# Patient Record
Sex: FEMALE | Race: BLACK OR AFRICAN AMERICAN | Employment: UNEMPLOYED | URBAN - METROPOLITAN AREA
[De-identification: names, ages, dates, MRNs, and addresses within clinical notes are randomized per-mention and may not be internally consistent; named-entity substitution may affect disease eponyms.]

---

## 2018-10-23 ENCOUNTER — HOSPITAL ENCOUNTER (EMERGENCY)
Age: 23
Discharge: LEFT BEFORE DISCHARGE | End: 2018-10-23

## 2018-10-23 ENCOUNTER — HOSPITAL ENCOUNTER (EMERGENCY)
Age: 23
Discharge: HOME | End: 2018-10-23
Attending: EMERGENCY MEDICINE
Payer: MEDICAID

## 2018-10-23 VITALS
RESPIRATION RATE: 16 BRPM | WEIGHT: 250 LBS | BODY MASS INDEX: 42.68 KG/M2 | HEIGHT: 64 IN | TEMPERATURE: 98.5 F | OXYGEN SATURATION: 100 % | HEART RATE: 88 BPM | DIASTOLIC BLOOD PRESSURE: 68 MMHG | SYSTOLIC BLOOD PRESSURE: 145 MMHG

## 2018-10-23 DIAGNOSIS — R10.9 ABDOMINAL PAIN DURING PREGNANCY IN FIRST TRIMESTER: Primary | ICD-10-CM

## 2018-10-23 DIAGNOSIS — O26.891 ABDOMINAL PAIN DURING PREGNANCY IN FIRST TRIMESTER: Primary | ICD-10-CM

## 2018-10-23 LAB
BACTERIA: ABNORMAL /HPF
BILIRUBIN URINE: ABNORMAL
BLOOD, URINE: NEGATIVE
CASTS: ABNORMAL /LPF
CLARITY: CLEAR
COLOR: ABNORMAL
EPITHELIAL CELLS, UA: ABNORMAL /HPF
GLUCOSE URINE: NEGATIVE MG/DL
HCG(URINE) PREGNANCY TEST: POSITIVE
KETONES, URINE: NEGATIVE MG/DL
LEUKOCYTE ESTERASE, URINE: NEGATIVE
NITRITE, URINE: NEGATIVE
PH UA: 5 (ref 5–9)
PROTEIN UA: NEGATIVE MG/DL
RBC UA: ABNORMAL /HPF (ref 0–2)
SPECIFIC GRAVITY UA: >=1.03 (ref 1–1.03)
UROBILINOGEN, URINE: 0.2 E.U./DL
WBC UA: ABNORMAL /HPF (ref 0–5)

## 2018-10-23 PROCEDURE — 99284 EMERGENCY DEPT VISIT MOD MDM: CPT

## 2018-10-23 PROCEDURE — 81025 URINE PREGNANCY TEST: CPT

## 2018-10-23 PROCEDURE — 81001 URINALYSIS AUTO W/SCOPE: CPT

## 2018-10-24 ENCOUNTER — APPOINTMENT (OUTPATIENT)
Dept: ULTRASOUND IMAGING | Age: 23
End: 2018-10-24
Payer: MEDICAID

## 2018-10-24 ENCOUNTER — HOSPITAL ENCOUNTER (EMERGENCY)
Age: 23
Discharge: HOME | End: 2018-10-24
Attending: EMERGENCY MEDICINE
Payer: MEDICAID

## 2018-10-24 VITALS
HEART RATE: 79 BPM | DIASTOLIC BLOOD PRESSURE: 66 MMHG | WEIGHT: 264.13 LBS | BODY MASS INDEX: 46.8 KG/M2 | TEMPERATURE: 98.5 F | RESPIRATION RATE: 16 BRPM | SYSTOLIC BLOOD PRESSURE: 111 MMHG | HEIGHT: 63 IN | OXYGEN SATURATION: 99 %

## 2018-10-24 DIAGNOSIS — Z3A.01 LESS THAN 8 WEEKS GESTATION OF PREGNANCY: Primary | ICD-10-CM

## 2018-10-24 DIAGNOSIS — N89.8 VAGINAL DISCHARGE: ICD-10-CM

## 2018-10-24 DIAGNOSIS — N76.0 BACTERIAL VAGINOSIS: ICD-10-CM

## 2018-10-24 DIAGNOSIS — B96.89 BACTERIAL VAGINOSIS: ICD-10-CM

## 2018-10-24 LAB
ANION GAP SERPL CALCULATED.3IONS-SCNC: 8 MMOL/L (ref 7–16)
BACTERIA: ABNORMAL /HPF
BASOPHILS ABSOLUTE: 0.08 E9/L (ref 0–0.2)
BASOPHILS RELATIVE PERCENT: 1.2 % (ref 0–2)
BILIRUBIN URINE: NEGATIVE
BLOOD, URINE: NEGATIVE
BUN BLDV-MCNC: 7 MG/DL (ref 6–20)
CALCIUM SERPL-MCNC: 9.2 MG/DL (ref 8.6–10.2)
CHLORIDE BLD-SCNC: 100 MMOL/L (ref 98–107)
CLARITY: ABNORMAL
CLUE CELLS: ABNORMAL
CO2: 27 MMOL/L (ref 22–29)
COLOR: YELLOW
CREAT SERPL-MCNC: 0.7 MG/DL (ref 0.5–1)
EOSINOPHILS ABSOLUTE: 0.13 E9/L (ref 0.05–0.5)
EOSINOPHILS RELATIVE PERCENT: 2 % (ref 0–6)
EPITHELIAL CELLS, UA: ABNORMAL /HPF
GFR AFRICAN AMERICAN: >60
GFR NON-AFRICAN AMERICAN: >60 ML/MIN/1.73
GLUCOSE BLD-MCNC: 88 MG/DL (ref 74–109)
GLUCOSE URINE: NEGATIVE MG/DL
GONADOTROPIN, CHORIONIC (HCG) QUANT: 321.2 MIU/ML
HCT VFR BLD CALC: 40.8 % (ref 34–48)
HEMOGLOBIN: 13.5 G/DL (ref 11.5–15.5)
IMMATURE GRANULOCYTES #: 0.01 E9/L
IMMATURE GRANULOCYTES %: 0.2 % (ref 0–5)
KETONES, URINE: NEGATIVE MG/DL
LEUKOCYTE ESTERASE, URINE: NEGATIVE
LYMPHOCYTES ABSOLUTE: 2.18 E9/L (ref 1.5–4)
LYMPHOCYTES RELATIVE PERCENT: 33.9 % (ref 20–42)
MCH RBC QN AUTO: 30 PG (ref 26–35)
MCHC RBC AUTO-ENTMCNC: 33.1 % (ref 32–34.5)
MCV RBC AUTO: 90.7 FL (ref 80–99.9)
MONOCYTES ABSOLUTE: 0.45 E9/L (ref 0.1–0.95)
MONOCYTES RELATIVE PERCENT: 7 % (ref 2–12)
NEUTROPHILS ABSOLUTE: 3.58 E9/L (ref 1.8–7.3)
NEUTROPHILS RELATIVE PERCENT: 55.7 % (ref 43–80)
NITRITE, URINE: NEGATIVE
PDW BLD-RTO: 12.7 FL (ref 11.5–15)
PH UA: 8.5 (ref 5–9)
PLATELET # BLD: 333 E9/L (ref 130–450)
PMV BLD AUTO: 9.1 FL (ref 7–12)
POTASSIUM SERPL-SCNC: 3.9 MMOL/L (ref 3.5–5)
PROTEIN UA: NEGATIVE MG/DL
RBC # BLD: 4.5 E12/L (ref 3.5–5.5)
RBC UA: ABNORMAL /HPF (ref 0–2)
SODIUM BLD-SCNC: 135 MMOL/L (ref 132–146)
SOURCE WET PREP: ABNORMAL
SPECIFIC GRAVITY UA: 1.01 (ref 1–1.03)
TRICHOMONAS PREP: ABNORMAL
UROBILINOGEN, URINE: 0.2 E.U./DL
WBC # BLD: 6.4 E9/L (ref 4.5–11.5)
WBC UA: ABNORMAL /HPF (ref 0–5)
YEAST WET PREP: ABNORMAL

## 2018-10-24 PROCEDURE — 87491 CHLMYD TRACH DNA AMP PROBE: CPT

## 2018-10-24 PROCEDURE — 80048 BASIC METABOLIC PNL TOTAL CA: CPT

## 2018-10-24 PROCEDURE — 81001 URINALYSIS AUTO W/SCOPE: CPT

## 2018-10-24 PROCEDURE — 76817 TRANSVAGINAL US OBSTETRIC: CPT

## 2018-10-24 PROCEDURE — 87210 SMEAR WET MOUNT SALINE/INK: CPT

## 2018-10-24 PROCEDURE — 84702 CHORIONIC GONADOTROPIN TEST: CPT

## 2018-10-24 PROCEDURE — 85025 COMPLETE CBC W/AUTO DIFF WBC: CPT

## 2018-10-24 PROCEDURE — 87591 N.GONORRHOEAE DNA AMP PROB: CPT

## 2018-10-24 PROCEDURE — 6370000000 HC RX 637 (ALT 250 FOR IP): Performed by: EMERGENCY MEDICINE

## 2018-10-24 PROCEDURE — 99284 EMERGENCY DEPT VISIT MOD MDM: CPT

## 2018-10-24 PROCEDURE — 76801 OB US < 14 WKS SINGLE FETUS: CPT

## 2018-10-24 PROCEDURE — 36415 COLL VENOUS BLD VENIPUNCTURE: CPT

## 2018-10-24 RX ORDER — ACETAMINOPHEN 325 MG/1
650 TABLET ORAL ONCE
Status: COMPLETED | OUTPATIENT
Start: 2018-10-24 | End: 2018-10-24

## 2018-10-24 RX ADMIN — ACETAMINOPHEN 650 MG: 325 TABLET, FILM COATED ORAL at 12:39

## 2018-10-24 ASSESSMENT — PAIN SCALES - GENERAL
PAINLEVEL_OUTOF10: 6
PAINLEVEL_OUTOF10: 5

## 2018-10-24 ASSESSMENT — PAIN DESCRIPTION - LOCATION: LOCATION: ABDOMEN;BACK

## 2018-10-24 ASSESSMENT — ENCOUNTER SYMPTOMS
BLOOD IN STOOL: 0
ABDOMINAL PAIN: 1
SHORTNESS OF BREATH: 0
ABDOMINAL DISTENTION: 0
COUGH: 0
EYE REDNESS: 0
NAUSEA: 0
BACK PAIN: 1
WHEEZING: 0
DIARRHEA: 0
EYE DISCHARGE: 0
SORE THROAT: 0
EYE PAIN: 0
VOMITING: 0
CONSTIPATION: 0

## 2018-10-24 ASSESSMENT — PAIN DESCRIPTION - PAIN TYPE: TYPE: ACUTE PAIN

## 2018-10-24 NOTE — ED PROVIDER NOTES
The patient is a G3P[de-identified]73-year-old female presents to the ED for the chief complaint of abdominal pain and back pain. Onset started a couple days ago. Her abdominal pain is located in the middle of her lower abdomen. She describes it as a constant, aching, cramping pain. She reports it feels like a menstrual period but is later and hurts less than a menstrual period. She also complains of low back pain. She has tried Tylenol with some relief. The patient reports she was seen at the urgent care yesterday for her symptoms and a urine pregnancy test was positive. She was instructed to come to the emergency department for evaluation for possible ectopic pregnancy. She denies any fever, cough, chest pain, shortness of breath, nausea, vomiting or diarrhea. She denies any dysuria or hematuria. She does complain of white, vaginal discharge. She denies any vaginal bleeding or abdominal trauma. Patient reports she has a history of gonorrhea, chlamydia and syphilis in the past where she was treated. She does not have an obstetrician gynecologist. She is not currently on prenatal vitamins. She denies taking any medications except for occasional Tylenol and took half dose of a Flexeril pill last week before she found out she was pregnant. She denies any smoking, alcohol or illicit drug use. The history is provided by the patient. Review of Systems   Constitutional: Negative for chills and fever. HENT: Negative for ear pain and sore throat. Eyes: Negative for pain, discharge and redness. Respiratory: Negative for cough, shortness of breath and wheezing. Cardiovascular: Negative for chest pain. Gastrointestinal: Positive for abdominal pain. Negative for abdominal distention, blood in stool, constipation, diarrhea, nausea and vomiting. Genitourinary: Positive for vaginal discharge. Negative for difficulty urinating, dysuria, frequency, hematuria and vaginal bleeding.    Musculoskeletal: Positive for back discharge in the vaginal vault. Cervix is closed. No vaginal bleeding. No foreign body visualized. 3:05 PM  Discussed results and plan thus far with patient. Discussed results of the blood work as well as the serum hCG the ultrasound. Discussed at that the serum hCG level is low which may indicate an early pregnancy, but we cannot rule out an ectopic pregnancy at this time because no gestation is identified at this time. 4:06 PM  Discussed case with OB/GYN on call, Dr. Marcial Baker and recommends to obtain a repeat serum HCG in 48 hours  --Friday. He recommends to have her call the office to arrange for a follow-up appointment. 4:20 PM  Discussed results and plan with patient and she is comfortable to go home. Recommended Tylenol for her pain as needed. Also recommended for her to take prenatal vitamins and to stay hydrated and drink plenty of fluids. Informed the patient that she has 1+ clue cells indicating bacterial vaginosis. Gonorrhea and chlamydia test was sent off. Discussed with her that we will not have those results back for about 3 days and instructed her to call the hospital for results of that test. She agrees and understands. I also instructed her to inform her sexual partners if those tests are positive to be evaluated and treated and she understands and agrees. I discussed that I discussed her case and findings with the obstetrician gynecologist on call and that they recommend a repeat serum hCG in 48 hours or on Friday. The patient was given a prescription to obtain that test and have the results forwarded to Dr. Marcial Baker. She agrees and understands. She also agrees to call his office to arrange for follow-up appointment. We discussed the findings of her blood work as well as imaging indicating early pregnancy; however, we cannot rule out an ectopic pregnancy at this time. Her questions were answered at this time.          I have spoken with the patient and discussed todays results, in

## 2018-10-24 NOTE — ED NOTES
ER resident physician and medical student notified multiple times that patient is ready for pelvic exam.     Ariadna Lopez RN  10/24/18 [id number]

## 2018-10-29 LAB
CHLAMYDIA TRACHOMATIS AMPLIFIED DET: NORMAL
N GONORRHOEAE AMPLIFIED DET: NORMAL

## 2018-12-31 ENCOUNTER — HOSPITAL ENCOUNTER (OUTPATIENT)
Age: 23
Discharge: HOME | End: 2018-12-31
Payer: COMMERCIAL

## 2018-12-31 LAB
ABO/RH: NORMAL
AMPHETAMINE SCREEN, URINE: NOT DETECTED
ANTIBODY SCREEN: NORMAL
BARBITURATE SCREEN URINE: NOT DETECTED
BENZODIAZEPINE SCREEN, URINE: NOT DETECTED
CANNABINOID SCREEN URINE: NOT DETECTED
COCAINE METABOLITE SCREEN URINE: NOT DETECTED
HCT VFR BLD CALC: 40.1 % (ref 34–48)
HEMOGLOBIN: 13.7 G/DL (ref 11.5–15.5)
MCH RBC QN AUTO: 30.2 PG (ref 26–35)
MCHC RBC AUTO-ENTMCNC: 34.2 % (ref 32–34.5)
MCV RBC AUTO: 88.5 FL (ref 80–99.9)
METHADONE SCREEN, URINE: NOT DETECTED
OPIATE SCREEN URINE: NOT DETECTED
PDW BLD-RTO: 12.2 FL (ref 11.5–15)
PHENCYCLIDINE SCREEN URINE: NOT DETECTED
PLATELET # BLD: 371 E9/L (ref 130–450)
PMV BLD AUTO: 9.3 FL (ref 7–12)
PROPOXYPHENE SCREEN: NOT DETECTED
RBC # BLD: 4.53 E12/L (ref 3.5–5.5)
WBC # BLD: 9.5 E9/L (ref 4.5–11.5)

## 2018-12-31 PROCEDURE — 86901 BLOOD TYPING SEROLOGIC RH(D): CPT

## 2018-12-31 PROCEDURE — 80307 DRUG TEST PRSMV CHEM ANLYZR: CPT

## 2018-12-31 PROCEDURE — 36415 COLL VENOUS BLD VENIPUNCTURE: CPT

## 2018-12-31 PROCEDURE — 86850 RBC ANTIBODY SCREEN: CPT

## 2018-12-31 PROCEDURE — 85027 COMPLETE CBC AUTOMATED: CPT

## 2018-12-31 PROCEDURE — 86803 HEPATITIS C AB TEST: CPT

## 2018-12-31 PROCEDURE — 86900 BLOOD TYPING SEROLOGIC ABO: CPT

## 2018-12-31 PROCEDURE — 86762 RUBELLA ANTIBODY: CPT

## 2018-12-31 PROCEDURE — 86592 SYPHILIS TEST NON-TREP QUAL: CPT

## 2018-12-31 PROCEDURE — 87340 HEPATITIS B SURFACE AG IA: CPT

## 2018-12-31 PROCEDURE — 86703 HIV-1/HIV-2 1 RESULT ANTBDY: CPT

## 2019-01-02 LAB
HEPATITIS B SURFACE ANTIGEN INTERPRETATION: NORMAL
HEPATITIS C ANTIBODY INTERPRETATION: NORMAL
HIV-1 AND HIV-2 ANTIBODIES: NORMAL
Lab: NORMAL
REPORT: NORMAL
RPR: NORMAL
THIS TEST SENT TO: NORMAL

## 2019-01-03 LAB — RUBELLA ANTIBODY IGG: NORMAL

## 2019-03-23 ENCOUNTER — HOSPITAL ENCOUNTER (OUTPATIENT)
Age: 24
Discharge: HOME | End: 2019-03-23
Attending: OBSTETRICS & GYNECOLOGY | Admitting: OBSTETRICS & GYNECOLOGY
Payer: COMMERCIAL

## 2019-03-23 VITALS
WEIGHT: 246 LBS | DIASTOLIC BLOOD PRESSURE: 55 MMHG | HEART RATE: 86 BPM | TEMPERATURE: 96.1 F | SYSTOLIC BLOOD PRESSURE: 117 MMHG | BODY MASS INDEX: 42 KG/M2 | RESPIRATION RATE: 16 BRPM | HEIGHT: 64 IN

## 2019-03-23 PROBLEM — Z32.02 PREGNANCY EXAMINATION OR TEST, NEGATIVE RESULT: Status: ACTIVE | Noted: 2019-03-23

## 2019-03-23 PROCEDURE — 99211 OFF/OP EST MAY X REQ PHY/QHP: CPT

## 2019-04-03 ENCOUNTER — HOSPITAL ENCOUNTER (OUTPATIENT)
Age: 24
Discharge: HOME | End: 2019-04-03
Payer: COMMERCIAL

## 2019-04-03 LAB
BASOPHILS ABSOLUTE: 0.05 E9/L (ref 0–0.2)
BASOPHILS RELATIVE PERCENT: 0.5 % (ref 0–2)
EOSINOPHILS ABSOLUTE: 0.18 E9/L (ref 0.05–0.5)
EOSINOPHILS RELATIVE PERCENT: 1.7 % (ref 0–6)
GLUCOSE TOLERANCE SCREEN 50G: 118 MG/DL (ref 70–140)
HCT VFR BLD CALC: 36 % (ref 34–48)
HEMOGLOBIN: 11.6 G/DL (ref 11.5–15.5)
IMMATURE GRANULOCYTES #: 0.08 E9/L
IMMATURE GRANULOCYTES %: 0.8 % (ref 0–5)
LYMPHOCYTES ABSOLUTE: 1.76 E9/L (ref 1.5–4)
LYMPHOCYTES RELATIVE PERCENT: 16.9 % (ref 20–42)
MCH RBC QN AUTO: 30 PG (ref 26–35)
MCHC RBC AUTO-ENTMCNC: 32.2 % (ref 32–34.5)
MCV RBC AUTO: 93 FL (ref 80–99.9)
MONOCYTES ABSOLUTE: 0.54 E9/L (ref 0.1–0.95)
MONOCYTES RELATIVE PERCENT: 5.2 % (ref 2–12)
NEUTROPHILS ABSOLUTE: 7.81 E9/L (ref 1.8–7.3)
NEUTROPHILS RELATIVE PERCENT: 74.9 % (ref 43–80)
PDW BLD-RTO: 12.5 FL (ref 11.5–15)
PLATELET # BLD: 344 E9/L (ref 130–450)
PMV BLD AUTO: 9.7 FL (ref 7–12)
RBC # BLD: 3.87 E12/L (ref 3.5–5.5)
WBC # BLD: 10.4 E9/L (ref 4.5–11.5)

## 2019-04-03 PROCEDURE — 36415 COLL VENOUS BLD VENIPUNCTURE: CPT

## 2019-04-03 PROCEDURE — 82950 GLUCOSE TEST: CPT

## 2019-04-03 PROCEDURE — 85025 COMPLETE CBC W/AUTO DIFF WBC: CPT

## 2019-04-24 ENCOUNTER — HOSPITAL ENCOUNTER (OUTPATIENT)
Age: 24
Discharge: HOME | End: 2019-04-24
Attending: OBSTETRICS & GYNECOLOGY | Admitting: OBSTETRICS & GYNECOLOGY
Payer: COMMERCIAL

## 2019-04-24 VITALS
TEMPERATURE: 98.6 F | HEART RATE: 83 BPM | RESPIRATION RATE: 18 BRPM | SYSTOLIC BLOOD PRESSURE: 116 MMHG | DIASTOLIC BLOOD PRESSURE: 65 MMHG

## 2019-04-24 PROBLEM — O26.93 COMPLICATED PREGNANCY, THIRD TRIMESTER: Status: ACTIVE | Noted: 2019-04-24

## 2019-04-24 LAB
ALBUMIN SERPL-MCNC: 3.6 G/DL (ref 3.5–5.2)
ALP BLD-CCNC: 82 U/L (ref 35–104)
ALT SERPL-CCNC: 6 U/L (ref 0–32)
ANION GAP SERPL CALCULATED.3IONS-SCNC: 11 MMOL/L (ref 7–16)
AST SERPL-CCNC: 15 U/L (ref 0–31)
BACTERIA: ABNORMAL /HPF
BASOPHILS ABSOLUTE: 0.04 E9/L (ref 0–0.2)
BASOPHILS RELATIVE PERCENT: 0.4 % (ref 0–2)
BILIRUB SERPL-MCNC: 0.3 MG/DL (ref 0–1.2)
BILIRUBIN URINE: NEGATIVE
BLOOD, URINE: NEGATIVE
BUN BLDV-MCNC: 5 MG/DL (ref 6–20)
CALCIUM SERPL-MCNC: 9.3 MG/DL (ref 8.6–10.2)
CHLORIDE BLD-SCNC: 103 MMOL/L (ref 98–107)
CLARITY: ABNORMAL
CO2: 22 MMOL/L (ref 22–29)
COLOR: ABNORMAL
CREAT SERPL-MCNC: 0.4 MG/DL (ref 0.5–1)
EOSINOPHILS ABSOLUTE: 0.17 E9/L (ref 0.05–0.5)
EOSINOPHILS RELATIVE PERCENT: 1.6 % (ref 0–6)
EPITHELIAL CELLS, UA: ABNORMAL /HPF
FETAL FIBRONECTIN: NEGATIVE
GFR AFRICAN AMERICAN: >60
GFR NON-AFRICAN AMERICAN: >60 ML/MIN/1.73
GLUCOSE BLD-MCNC: 69 MG/DL (ref 74–99)
GLUCOSE URINE: NEGATIVE MG/DL
HCT VFR BLD CALC: 34.6 % (ref 34–48)
HEMOGLOBIN: 11.3 G/DL (ref 11.5–15.5)
IMMATURE GRANULOCYTES #: 0.07 E9/L
IMMATURE GRANULOCYTES %: 0.7 % (ref 0–5)
KETONES, URINE: NEGATIVE MG/DL
LEUKOCYTE ESTERASE, URINE: ABNORMAL
LYMPHOCYTES ABSOLUTE: 1.77 E9/L (ref 1.5–4)
LYMPHOCYTES RELATIVE PERCENT: 16.5 % (ref 20–42)
MCH RBC QN AUTO: 29.4 PG (ref 26–35)
MCHC RBC AUTO-ENTMCNC: 32.7 % (ref 32–34.5)
MCV RBC AUTO: 90.1 FL (ref 80–99.9)
MONOCYTES ABSOLUTE: 0.83 E9/L (ref 0.1–0.95)
MONOCYTES RELATIVE PERCENT: 7.7 % (ref 2–12)
MUCUS: PRESENT
NEUTROPHILS ABSOLUTE: 7.84 E9/L (ref 1.8–7.3)
NEUTROPHILS RELATIVE PERCENT: 73.1 % (ref 43–80)
NITRITE, URINE: NEGATIVE
PDW BLD-RTO: 12.4 FL (ref 11.5–15)
PH UA: 6 (ref 5–9)
PLATELET # BLD: 413 E9/L (ref 130–450)
PMV BLD AUTO: 9.7 FL (ref 7–12)
POTASSIUM SERPL-SCNC: 4.3 MMOL/L (ref 3.5–5)
PROTEIN UA: NEGATIVE MG/DL
RBC # BLD: 3.84 E12/L (ref 3.5–5.5)
RBC UA: ABNORMAL /HPF (ref 0–2)
SODIUM BLD-SCNC: 136 MMOL/L (ref 132–146)
SPECIFIC GRAVITY UA: 1.02 (ref 1–1.03)
TOTAL PROTEIN: 7.1 G/DL (ref 6.4–8.3)
UROBILINOGEN, URINE: 0.2 E.U./DL
WBC # BLD: 10.7 E9/L (ref 4.5–11.5)
WBC UA: ABNORMAL /HPF (ref 0–5)
YEAST: ABNORMAL

## 2019-04-24 PROCEDURE — 96360 HYDRATION IV INFUSION INIT: CPT

## 2019-04-24 PROCEDURE — 96361 HYDRATE IV INFUSION ADD-ON: CPT

## 2019-04-24 PROCEDURE — 99211 OFF/OP EST MAY X REQ PHY/QHP: CPT

## 2019-04-24 PROCEDURE — 80053 COMPREHEN METABOLIC PANEL: CPT

## 2019-04-24 PROCEDURE — 82731 ASSAY OF FETAL FIBRONECTIN: CPT

## 2019-04-24 PROCEDURE — 85025 COMPLETE CBC W/AUTO DIFF WBC: CPT

## 2019-04-24 PROCEDURE — 81001 URINALYSIS AUTO W/SCOPE: CPT

## 2019-04-24 PROCEDURE — 2580000003 HC RX 258: Performed by: OBSTETRICS & GYNECOLOGY

## 2019-04-24 PROCEDURE — 36415 COLL VENOUS BLD VENIPUNCTURE: CPT

## 2019-04-24 RX ORDER — SODIUM CHLORIDE, SODIUM LACTATE, POTASSIUM CHLORIDE, CALCIUM CHLORIDE 600; 310; 30; 20 MG/100ML; MG/100ML; MG/100ML; MG/100ML
INJECTION, SOLUTION INTRAVENOUS CONTINUOUS
Status: DISCONTINUED | OUTPATIENT
Start: 2019-04-24 | End: 2019-04-25 | Stop reason: HOSPADM

## 2019-04-24 RX ORDER — SODIUM CHLORIDE, SODIUM LACTATE, POTASSIUM CHLORIDE, AND CALCIUM CHLORIDE .6; .31; .03; .02 G/100ML; G/100ML; G/100ML; G/100ML
500 INJECTION, SOLUTION INTRAVENOUS ONCE
Status: COMPLETED | OUTPATIENT
Start: 2019-04-24 | End: 2019-04-24

## 2019-04-24 RX ADMIN — SODIUM CHLORIDE, POTASSIUM CHLORIDE, SODIUM LACTATE AND CALCIUM CHLORIDE 500 ML: 600; 310; 30; 20 INJECTION, SOLUTION INTRAVENOUS at 20:00

## 2019-04-24 NOTE — PROGRESS NOTES
Ambulatory to [address] [place] [address] c/o constant cramping x2days. Perceives fetal movement. EFM applied. SVE deferred. Patient states she had a feeding tube at birth and that scar hurts on the inside.

## 2019-04-25 NOTE — PROGRESS NOTES
Patient now states that whenever she up (I.e. Cooking) she will have episodes of dizziness. Dr. Javon Ruiz informed. Orders received.

## 2019-04-27 PROBLEM — R42 DIZZINESS AND GIDDINESS: Status: ACTIVE | Noted: 2019-04-27

## 2019-04-27 NOTE — H&P
Subjective:      Иван Leal is an 26 y.o. female at 27 and 0/7 weeks gestation presenting with   Chief Complaint   Patient presents with    Abdominal Pain   Other associated symptoms include diziness. Fetal Movement: normal.  Past Medical History:   Diagnosis Date    E coli enteritis     Headache     Premature birth        Review of Systems  Pertinent items are noted in HPI. Objective:      /65   Pulse 83   Temp 98.6 °F (37 °C) (Oral)   Resp 18   LMP 09/25/2018   Blood pressure 116/65, pulse 83, temperature 98.6 °F (37 °C), temperature source Oral, resp. rate 18, last menstrual period 09/25/2018. General:   alert, appears stated age and cooperative   Cervix:   closed.    FHT:   135 BPM     Lab Review    CBC:   Lab Results   Component Value Date    WBC 10.7 04/24/2019    RBC 3.84 04/24/2019    HGB 11.3 04/24/2019    HCT 34.6 04/24/2019    MCV 90.1 04/24/2019    MCH 29.4 04/24/2019    MCHC 32.7 04/24/2019    RDW 12.4 04/24/2019     04/24/2019    MPV 9.7 04/24/2019     CMP:    Lab Results   Component Value Date     04/24/2019    K 4.3 04/24/2019     04/24/2019    CO2 22 04/24/2019    BUN 5 04/24/2019    CREATININE 0.4 04/24/2019    GFRAA >60 04/24/2019    LABGLOM >60 04/24/2019    GLUCOSE 69 04/24/2019    PROT 7.1 04/24/2019    LABALBU 3.6 04/24/2019    CALCIUM 9.3 04/24/2019    BILITOT 0.3 04/24/2019    ALKPHOS 82 04/24/2019    AST 15 04/24/2019    ALT 6 04/24/2019     U/A:    Lab Results   Component Value Date    COLORU DKYELLOW 04/24/2019    PHUR 6.0 04/24/2019    LABCAST FEW 10/23/2018    WBCUA 5-10 04/24/2019    RBCUA NONE 04/24/2019    RBCUA NONE SEEN 05/09/2018    MUCUS Present 04/24/2019    TRICHOMONAS NONE SEEN 05/09/2018    YEAST RARE 04/24/2019    BACTERIA MANY 04/24/2019    CLARITYU CLOUDY 04/24/2019    SPECGRAV 1.025 04/24/2019    LEUKOCYTESUR SMALL 04/24/2019    UROBILINOGEN 0.2 04/24/2019    BILIRUBINUR Negative 04/24/2019    BLOODU Negative 04/24/2019 GLUCOSEU Negative 04/24/2019          Assessment:     dizziness   Walton childs contractions    Plan:     IV fluids  Observation    Magda Alvarez MD,4/27/2019 10:41 AM

## 2022-07-31 ENCOUNTER — HOSPITAL ENCOUNTER (EMERGENCY)
Age: 27
Discharge: HOME | End: 2022-07-31
Payer: MEDICAID

## 2022-07-31 ENCOUNTER — HOSPITAL ENCOUNTER (EMERGENCY)
Age: 27
Discharge: HOME | End: 2022-07-31

## 2022-07-31 VITALS
DIASTOLIC BLOOD PRESSURE: 72 MMHG | TEMPERATURE: 98.4 F | OXYGEN SATURATION: 100 % | WEIGHT: 201.94 LBS | HEART RATE: 75 BPM | RESPIRATION RATE: 18 BRPM | BODY MASS INDEX: 34.66 KG/M2 | SYSTOLIC BLOOD PRESSURE: 106 MMHG

## 2022-07-31 DIAGNOSIS — B37.31 YEAST INFECTION OF THE VAGINA: Primary | ICD-10-CM

## 2022-07-31 DIAGNOSIS — B96.89 BACTERIAL VAGINOSIS: ICD-10-CM

## 2022-07-31 DIAGNOSIS — Z71.1 CONCERN ABOUT STD IN FEMALE WITHOUT DIAGNOSIS: ICD-10-CM

## 2022-07-31 DIAGNOSIS — N76.0 BACTERIAL VAGINOSIS: ICD-10-CM

## 2022-07-31 LAB
BACTERIA WET PREP: ABNORMAL
BACTERIA: ABNORMAL /HPF
BILIRUBIN URINE: NEGATIVE
BLOOD, URINE: NEGATIVE
BUDDING YEAST: PRESENT
CLARITY: ABNORMAL
CLUE CELLS: ABNORMAL
COLOR: YELLOW
EPITHELIAL CELLS WET PREP: ABNORMAL
EPITHELIAL CELLS, UA: 21 /HPF (ref 0–5)
GLUCOSE URINE: NEGATIVE MG/DL
HCG(URINE) PREGNANCY TEST: NEGATIVE
HYALINE CASTS: 0 /LPF (ref 0–8)
KETONES, URINE: NEGATIVE MG/DL
LEUKOCYTE ESTERASE, URINE: ABNORMAL
MICROSCOPIC EXAMINATION: YES
NITRITE, URINE: NEGATIVE
PH UA: 8 (ref 5–8)
PROTEIN UA: NEGATIVE MG/DL
RBC UA: 2 /HPF (ref 0–4)
RBC WET PREP: ABNORMAL
SOURCE WET PREP: ABNORMAL
SPECIFIC GRAVITY UA: 1.01 (ref 1–1.03)
TRICHOMONAS PREP: ABNORMAL
URINE REFLEX TO CULTURE: YES
URINE TYPE: ABNORMAL
UROBILINOGEN, URINE: 1 E.U./DL
WBC UA: 25 /HPF (ref 0–5)
WBC WET PREP: ABNORMAL
YEAST HYPHAE, UR: PRESENT
YEAST WET PREP: ABNORMAL

## 2022-07-31 PROCEDURE — 87086 URINE CULTURE/COLONY COUNT: CPT

## 2022-07-31 PROCEDURE — 87210 SMEAR WET MOUNT SALINE/INK: CPT

## 2022-07-31 PROCEDURE — 6360000002 HC RX W HCPCS: Performed by: NURSE PRACTITIONER

## 2022-07-31 PROCEDURE — 84703 CHORIONIC GONADOTROPIN ASSAY: CPT

## 2022-07-31 PROCEDURE — 99284 EMERGENCY DEPT VISIT MOD MDM: CPT

## 2022-07-31 PROCEDURE — 2500000003 HC RX 250 WO HCPCS

## 2022-07-31 PROCEDURE — 87491 CHLMYD TRACH DNA AMP PROBE: CPT

## 2022-07-31 PROCEDURE — 81001 URINALYSIS AUTO W/SCOPE: CPT

## 2022-07-31 PROCEDURE — 87591 N.GONORRHOEAE DNA AMP PROB: CPT

## 2022-07-31 PROCEDURE — 6370000000 HC RX 637 (ALT 250 FOR IP): Performed by: NURSE PRACTITIONER

## 2022-07-31 PROCEDURE — 96372 THER/PROPH/DIAG INJ SC/IM: CPT

## 2022-07-31 RX ORDER — METRONIDAZOLE 500 MG/1
500 TABLET ORAL ONCE
Status: COMPLETED | OUTPATIENT
Start: 2022-07-31 | End: 2022-07-31

## 2022-07-31 RX ORDER — LIDOCAINE HYDROCHLORIDE 10 MG/ML
INJECTION, SOLUTION EPIDURAL; INFILTRATION; INTRACAUDAL; PERINEURAL
Status: COMPLETED
Start: 2022-07-31 | End: 2022-07-31

## 2022-07-31 RX ORDER — DOXYCYCLINE HYCLATE 100 MG
100 TABLET ORAL 2 TIMES DAILY
Qty: 14 TABLET | Refills: 0 | Status: SHIPPED | OUTPATIENT
Start: 2022-07-31 | End: 2022-08-07

## 2022-07-31 RX ORDER — LIDOCAINE HYDROCHLORIDE 10 MG/ML
5 INJECTION, SOLUTION INFILTRATION; PERINEURAL ONCE
Status: COMPLETED | OUTPATIENT
Start: 2022-07-31 | End: 2022-07-31

## 2022-07-31 RX ORDER — FLUCONAZOLE 100 MG/1
150 TABLET ORAL ONCE
Status: COMPLETED | OUTPATIENT
Start: 2022-07-31 | End: 2022-07-31

## 2022-07-31 RX ORDER — CEFTRIAXONE 500 MG/1
500 INJECTION, POWDER, FOR SOLUTION INTRAMUSCULAR; INTRAVENOUS ONCE
Status: COMPLETED | OUTPATIENT
Start: 2022-07-31 | End: 2022-07-31

## 2022-07-31 RX ORDER — METRONIDAZOLE 500 MG/1
500 TABLET ORAL 2 TIMES DAILY
Qty: 14 TABLET | Refills: 0 | Status: SHIPPED | OUTPATIENT
Start: 2022-07-31 | End: 2022-08-07

## 2022-07-31 RX ORDER — FLUCONAZOLE 150 MG/1
150 TABLET ORAL ONCE
Qty: 1 TABLET | Refills: 0 | Status: SHIPPED | OUTPATIENT
Start: 2022-07-31 | End: 2022-07-31

## 2022-07-31 RX ORDER — DOXYCYCLINE HYCLATE 100 MG
100 TABLET ORAL ONCE
Status: COMPLETED | OUTPATIENT
Start: 2022-07-31 | End: 2022-07-31

## 2022-07-31 RX ADMIN — DOXYCYCLINE HYCLATE 100 MG: 100 TABLET, COATED ORAL at 16:27

## 2022-07-31 RX ADMIN — METRONIDAZOLE 500 MG: 500 TABLET ORAL at 16:26

## 2022-07-31 RX ADMIN — FLUCONAZOLE 150 MG: 100 TABLET ORAL at 19:30

## 2022-07-31 RX ADMIN — LIDOCAINE HYDROCHLORIDE 5 ML: 10 INJECTION, SOLUTION INFILTRATION; PERINEURAL at 16:27

## 2022-07-31 RX ADMIN — LIDOCAINE HYDROCHLORIDE 5 ML: 10 INJECTION, SOLUTION EPIDURAL; INFILTRATION; INTRACAUDAL; PERINEURAL at 16:27

## 2022-07-31 RX ADMIN — CEFTRIAXONE SODIUM 500 MG: 500 INJECTION, POWDER, FOR SOLUTION INTRAMUSCULAR; INTRAVENOUS at 16:28

## 2022-07-31 ASSESSMENT — PAIN SCALES - GENERAL
PAINLEVEL_OUTOF10: 0
PAINLEVEL_OUTOF10: 0

## 2022-07-31 NOTE — DISCHARGE INSTRUCTIONS
Your tests here today were positive for BV and yeast infection. Medications prescribed ensure that you do not drink alcohol with any antibiotic including doxycycline which will be taken 1 tablet 2 times a day for the next 7 days. Also, if you do drink alcohol, do not drink alcohol for at least 48 hours after finishing the Flagyl as this medication and mix with alcohol causes severe reaction called a disulfiram reaction so therefore do not drink for the next 9 days. Return to the emergency department for new or worsening symptoms including, but not limited to, developing open sores, inability to tolerate food or drink, urinary retention, fever, chills, sweats, or other concerns. You will need to use alternative forms of birth control as antibiotics interfere with the effectiveness of birth control pills. Do not drink alcohol while with the antibiotics. Do not have sexual contact/activities for the next 2 weeks/until the potential infection clears and when you resume sexual activities utilize condoms.     STD Clinics  FOR MORE INFORMATION ABOUT STDS, CONTACT YOUR PHYSICIAN OR:    Sexually [address] [place] [address] Department                              [facility], [address] [place] [address]                    [phone]    Sexually [facility] MEDICAL Department                              [address] [place] [address] Drive                    [phone]    [facility]                  [address]                   [place]                                           [phone]                      Stan Sandoval

## 2022-08-01 LAB
C TRACH DNA GENITAL QL NAA+PROBE: NEGATIVE
N. GONORRHOEAE DNA: NEGATIVE
URINE CULTURE, ROUTINE: NORMAL

## 2022-08-01 NOTE — ED NOTES
D/C: Order noted for d/c. Pt confirmed d/c paperwork have correct name. Discharge and education instructions reviewed with patient. Teach-back successful. Pt verbalized understanding and signed d/c papers. Pt denied questions at this time. No acute distress noted. Patient instructed to follow-up as noted - return to emergency department if symptoms worsen. Patient verbalized understanding. Discharged per EDMD with discharge instructions. Pt discharged to private vehicle. Patient stable upon departure. Thanked patient for choosing [facility] for care. Provider aware of patient pain at time of discharge.      Pepe Solis, RN  07/31/22 2008

## 2022-08-02 ASSESSMENT — ENCOUNTER SYMPTOMS
ANAL BLEEDING: 0
SHORTNESS OF BREATH: 0
COUGH: 0
SORE THROAT: 0
VOMITING: 0
ABDOMINAL PAIN: 0
BACK PAIN: 0
NAUSEA: 0
EYE PAIN: 0

## 2022-08-02 NOTE — ED PROVIDER NOTES
[address] [facility]  [address] [address]  Dept: [phone]  Loc: [address] ENCOUNTER        This patient was not seen or evaluated by the attending physician. I evaluated this patient, the attending physician was available for consultation. CHIEF COMPLAINT    Chief Complaint   Patient presents with    Urinary Tract Infection    Yeast Infection     Pt here with symptoms of UTI, yeast infection, and possibly an STD for ~ 1 week        HPI    Иван Wall is a 33 y.o. nontoxic and well-appearing, mildly distressed female who presents with concern for STD and UTI symptoms status post she had protected sex 4-5 days ago and prior to this her last sexual activity was reportedly 1 month prior. Onset of symptoms was 1 week ago. She endorses dysuria, urgency and vaginal \"irritation\". Denies nausea, vomiting, fever, chills, sweats, arthralgias/myalgias, open wounds/lesions on the genitalia, frequency, urinary retention, vaginal bleeding/discharge, abdominal pain, pelvic pain, or rashes. The duration has been constant since the onset. The severity of the symptoms are 4/10. There are no alleviating factors. LMP 7/14/2022. She is concerned for STIs and would like empiric treatment. Review of Systems   Constitutional:  Negative for chills, diaphoresis, fatigue and fever. HENT:  Negative for congestion and sore throat. Eyes:  Negative for pain and visual disturbance. Respiratory:  Negative for cough and shortness of breath. Cardiovascular:  Negative for chest pain and leg swelling. Gastrointestinal:  Negative for abdominal pain, anal bleeding, nausea and vomiting. Genitourinary:  Positive for dysuria and urgency. Negative for difficulty urinating, frequency, genital sores, hematuria, pelvic pain, vaginal bleeding, vaginal discharge and vaginal pain.    Musculoskeletal:  Negative for nose normal  Respiratory:  No respiratory distress  Cardiovascular: no JVD  GI:  Abdomen is non-distended, no abdominal tenderness  : Patient declined a pelvic exam, she chose to self swab  Back: No CVA tenderness  Integument:  Nondiaphoretic skin, no rashes noted to exposed skin  Musculoskeletal: No obvious joint swelling or limitations of joints range of motion  Neurologic: Awake and oriented, no slurred speech    ED COURSE & MEDICAL DECISION MAKING    See chart for details of medications given. Differential diagnosis: UTI, Pyelonephritis, Chlamydia/Gonorrhea, Trichomonas, Herpes genitalia, Venereal Warts (condyloma acuminata), Syphilis, HIV/AIDS, Chancroid (Haemophilus ducreyi), Lymphogranuloma venereum, Granuloma inguinale    Patient presents emerged department with concern for STD. She endorses dysuria and urgency as well as \"vaginal irritation\". I will obtain urine reflex to culture, wet prep, urine pregnancy, C. trachomatis, and gonorrhea DNA swabs. Patient would like empiric treatment. Work-up pending. Patient is afebrile and nontoxic in appearance. Patient declines pelvic exam.  She was treated in the ED empirically with Rocephin and doxycycline. Wet prep Yeast 1+ and clue cells 2+ and patient was therefore treated with Diflucan and Flagyl, negative for trichomonas. Urinalysis shows bacteria 1+, WBC 25, epithelials 21, budding yeast present, yeast hyphae present. Urine reflex to culture shows clarity cloudy, leukocyte Estrace large but otherwise unremarkable. Urine pregnancy negative. Urine culture pending. I instructed the patient to follow up as an outpatient in 1-2 days. I instructed the patient to have an outpatient HIV and Syphilis test, to be ordered by the follow-up doctor or at a local clinic. I will provide a list of local clinics with the discharge instructions. Patient will be prescribed doxycycline, Diflucan, Flagyl, and Aquaphor for external vaginal irritation.   I instructed the patient not to have sex for 2 weeks. I instructed the patient to return to the ED immediately for any new or worsening symptoms. The patient verbalizes understanding and is agreeable with the plan for discharge and follow-up. FINAL IMPRESSION    1. Yeast infection of the vagina    2. Bacterial vaginosis    3.  Concern for STD without diagnosis    PLAN  Discharge with outpatient follow-up    (Please note that this note was completed with a voice recognition program.  Every attempt was made to edit the dictations, but inevitably there remain words that are mis-transcribed.)         JESUS Dewitt - JEWELS  08/02/22 9034

## 2022-08-05 ENCOUNTER — OFFICE VISIT (OUTPATIENT)
Dept: INTERNAL MEDICINE CLINIC | Age: 27
End: 2022-08-05
Payer: MEDICAID

## 2022-08-05 VITALS
WEIGHT: 203 LBS | HEIGHT: 64 IN | HEART RATE: 78 BPM | RESPIRATION RATE: 18 BRPM | SYSTOLIC BLOOD PRESSURE: 110 MMHG | OXYGEN SATURATION: 99 % | BODY MASS INDEX: 34.66 KG/M2 | DIASTOLIC BLOOD PRESSURE: 72 MMHG

## 2022-08-05 DIAGNOSIS — Z09 HOSPITAL DISCHARGE FOLLOW-UP: Primary | ICD-10-CM

## 2022-08-05 DIAGNOSIS — B37.31 VULVOVAGINAL CANDIDIASIS: ICD-10-CM

## 2022-08-05 DIAGNOSIS — N76.0 BV (BACTERIAL VAGINOSIS): ICD-10-CM

## 2022-08-05 DIAGNOSIS — B96.89 BV (BACTERIAL VAGINOSIS): ICD-10-CM

## 2022-08-05 PROCEDURE — 99215 OFFICE O/P EST HI 40 MIN: CPT | Performed by: STUDENT IN AN ORGANIZED HEALTH CARE EDUCATION/TRAINING PROGRAM

## 2022-08-05 PROCEDURE — 1111F DSCHRG MED/CURRENT MED MERGE: CPT | Performed by: STUDENT IN AN ORGANIZED HEALTH CARE EDUCATION/TRAINING PROGRAM

## 2022-08-05 RX ORDER — FLUCONAZOLE 150 MG/1
150 TABLET ORAL DAILY
Qty: 2 TABLET | Refills: 0 | Status: SHIPPED | OUTPATIENT
Start: 2022-08-05 | End: 2022-08-07

## 2022-08-05 RX ORDER — METRONIDAZOLE 7.5 MG/G
GEL TOPICAL
Qty: 45 G | Refills: 0 | Status: SHIPPED | OUTPATIENT
Start: 2022-08-05 | End: 2022-10-03 | Stop reason: ALTCHOICE

## 2022-08-05 SDOH — ECONOMIC STABILITY: FOOD INSECURITY: WITHIN THE PAST 12 MONTHS, THE FOOD YOU BOUGHT JUST DIDN'T LAST AND YOU DIDN'T HAVE MONEY TO GET MORE.: NEVER TRUE

## 2022-08-05 SDOH — ECONOMIC STABILITY: TRANSPORTATION INSECURITY
IN THE PAST 12 MONTHS, HAS THE LACK OF TRANSPORTATION KEPT YOU FROM MEDICAL APPOINTMENTS OR FROM GETTING MEDICATIONS?: NO

## 2022-08-05 SDOH — ECONOMIC STABILITY: TRANSPORTATION INSECURITY
IN THE PAST 12 MONTHS, HAS LACK OF TRANSPORTATION KEPT YOU FROM MEETINGS, WORK, OR FROM GETTING THINGS NEEDED FOR DAILY LIVING?: NO

## 2022-08-05 SDOH — ECONOMIC STABILITY: FOOD INSECURITY: WITHIN THE PAST 12 MONTHS, YOU WORRIED THAT YOUR FOOD WOULD RUN OUT BEFORE YOU GOT MONEY TO BUY MORE.: NEVER TRUE

## 2022-08-05 ASSESSMENT — ENCOUNTER SYMPTOMS
TROUBLE SWALLOWING: 0
NAUSEA: 0
EYE DISCHARGE: 0
VOMITING: 0
CONSTIPATION: 0
COLOR CHANGE: 0
SORE THROAT: 0
CHEST TIGHTNESS: 0
BLOOD IN STOOL: 0
DIARRHEA: 0
SHORTNESS OF BREATH: 0
ABDOMINAL PAIN: 0
COUGH: 0

## 2022-08-05 ASSESSMENT — SOCIAL DETERMINANTS OF HEALTH (SDOH): HOW HARD IS IT FOR YOU TO PAY FOR THE VERY BASICS LIKE FOOD, HOUSING, MEDICAL CARE, AND HEATING?: NOT HARD AT ALL

## 2022-08-05 ASSESSMENT — PATIENT HEALTH QUESTIONNAIRE - PHQ9
SUM OF ALL RESPONSES TO PHQ9 QUESTIONS 1 & 2: 0
2. FEELING DOWN, DEPRESSED OR HOPELESS: 0
SUM OF ALL RESPONSES TO PHQ QUESTIONS 1-9: 0
1. LITTLE INTEREST OR PLEASURE IN DOING THINGS: 0
SUM OF ALL RESPONSES TO PHQ QUESTIONS 1-9: 0

## 2022-08-05 NOTE — PATIENT INSTRUCTIONS
Stop taking metronidazole and doxycycline. Use metronidazole gel and take fluconazole tablets 72 hours apart. Please stop taking medicine if you develop allergic reactions.

## 2022-08-05 NOTE — PROGRESS NOTES
Post-Discharge Transitional Care Follow Up      Иван Hyman is here for a ED follow up. YOB: 1995    Date of Office Visit:  8/5/2022  Date of Hospital Admission: 7/31/22  Date of Hospital Discharge: 7/31/22  Readmission Risk Score (high >=14%. Medium >=10%):No data recorded    Hospital discharge follow-up  -     NM DISCHARGE MEDS RECONCILED W/ CURRENT OUTPATIENT MED LIST  BV (bacterial vaginosis)  Not tolerating oral metronidazole-nausea  Will switch her to metronidazole gel.  -     metroNIDAZOLE (METROGEL) 0.75 % gel; Apply topically 2 times daily. , Disp-45 g, R-0, Normal  Vulvovaginal candidiasis  Will treat her as complicated candidiasis. Her LMP was on 7/14 and she confirms of not being pregnant. She was educated on maintaining genital hygiene and proper cleaning of her undergarments. -     fluconazole (DIFLUCAN) 150 MG tablet; Take 1 tablet by mouth in the morning for 2 doses. , Disp-2 tablet, R-0Take 2 tablets 72 hours apart    Medical Decision Making: high complexity  Return if symptoms worsen or fail to improve. Subjective:   LMP 7/14    Inpatient course: Discharge summary reviewed- see chart. Interval history/Current status:   She reports of having recurrent bacterial vaginosis and yeast infection in the past 1 year. She was discharged on doxycycline, metronidazole and single tablet of fluconazole. She has been throwing up her metronidazole and doxycycline. She reports that she continues to have itching of her vagina with white discharge. Medications listed as ordered at the time of discharge from hospital     Medication List            Accurate as of August 5, 2022 11:11 AM. If you have any questions, ask your nurse or doctor. START taking these medications      fluconazole 150 MG tablet  Commonly known as: DIFLUCAN  Take 1 tablet by mouth in the morning for 2 doses.   Started by: Cortney Stout MD     metroNIDAZOLE 0.75 % gel  Commonly known Upper Arm, Position: Sitting, Cuff Size: Large Adult)   Pulse 78   Resp 18   Ht 5' 4\" (1.626 m)   Wt 203 lb (92.1 kg)   LMP 07/14/2022   SpO2 99%   BMI 34.84 kg/m²   Physical Exam  Constitutional:       General: She is not in acute distress. Appearance: Normal appearance. Eyes:      General: No scleral icterus. Extraocular Movements: Extraocular movements intact. Conjunctiva/sclera: Conjunctivae normal.   Cardiovascular:      Heart sounds: S1 normal and S2 normal. No murmur heard. Pulmonary:      Effort: No respiratory distress. Breath sounds: No wheezing. Abdominal:      General: Bowel sounds are normal. There is no distension. Palpations: Abdomen is soft. Tenderness: There is no abdominal tenderness. There is no guarding. Genitourinary:     Vagina: Vaginal discharge (Whitish discharge.) present. Musculoskeletal:         General: No deformity. Cervical back: Normal range of motion. No rigidity. Right lower leg: No edema. Left lower leg: No edema. Skin:     Findings: No lesion or rash. Neurological:      General: No focal deficit present. Mental Status: She is alert and oriented to person, place, and time. Gait: Gait normal.   Psychiatric:         Mood and Affect: Mood normal.       An electronic signature was used to authenticate this note.   --Sheron Monroe MD

## 2022-08-05 NOTE — PROGRESS NOTES
Иван Chen (:  1995) is a 33 y.o. female,New patient, here for ED follow up. H/o chlamydia infection. , she had emergency visit and was treated for yeast infection and bacterial vaginosis. ASSESSMENT/PLAN:  {There are no diagnoses linked to this encounter. (Refresh or delete this SmartLink)}    No follow-ups on file. Subjective   SUBJECTIVE/OBJECTIVE:  HPI    Review of Systems       Objective   Physical Exam       {Time Documentation Optional:[id number]}      An electronic signature was used to authenticate this note.     --Porsha Gómez MD

## 2022-08-16 ENCOUNTER — TELEPHONE (OUTPATIENT)
Dept: INTERNAL MEDICINE CLINIC | Age: 27
End: 2022-08-16

## 2022-08-16 NOTE — TELEPHONE ENCOUNTER
Has yeast infection. States had taken all the med and a week later symptoms have returned. Requesting to have med called in for her. Unable to come in for an appointment due to work schedule. Please call meds into [facility].  Pt is reachable at [phone]

## 2022-08-17 ENCOUNTER — TELEPHONE (OUTPATIENT)
Dept: INTERNAL MEDICINE CLINIC | Age: 27
End: 2022-08-17

## 2022-08-17 NOTE — TELEPHONE ENCOUNTER
----- Message from Jorge L Jones sent at 8/16/2022  8:16 AM EDT -----  Subject: Refill Request    QUESTIONS  Name of Medication? fluconazole (DIFLUCAN) 150 MG tablet  Patient-reported dosage and instructions? 1 dose every 72hrs x\"3  How many days do you have left? 0  [id number]  Pharmacy phone number (if available)? [phone]  Additional Information for Provider? Pt states that she has an yeast   infection and would like to be prescribed medication.   ---------------------------------------------------------------------------  --------------  CALL BACK INFO  What is the best way for the office to contact you? OK to leave message on   voicemail  Preferred Call Back Phone Number? [phone]  ---------------------------------------------------------------------------  --------------  SCRIPT ANSWERS  Relationship to Patient?  Self

## 2022-08-17 NOTE — TELEPHONE ENCOUNTER
----- Message from [employer] sent at 8/16/2022  3:09 PM EDT -----  Subject: Refill Request    QUESTIONS  Name of Medication? metroNIDAZOLE (FLAGYL) 500 MG tablet  Patient-reported dosage and instructions? 1 every 72 hrs  How many days do you have left? 0  [facility]? [id number]  Pharmacy phone number (if available)? [phone]  Additional Information for Provider? Needs more or stronger rx as yeast   infection got better then came back shortly after she stopped. Patient   states the yeast infection comes back every month after her period. Also   requests a cream for burning and itching.   ---------------------------------------------------------------------------  --------------  CALL BACK INFO  What is the best way for the office to contact you? OK to leave message on   voicemail  Preferred Call Back Phone Number? [phone]  ---------------------------------------------------------------------------  --------------  SCRIPT ANSWERS  Relationship to Patient?  Self

## 2022-08-17 NOTE — TELEPHONE ENCOUNTER
Pt said she called three times yesterday for medication for yeast infection. Pt said she finished three doses of Diflucan on 8-9-22 She had her period for 6 days or so. After her period (two days ago) she now has a bad yeast infection again. She has severe itching , swollen and burning. Pt cannot come in for an appt. She would like medication please. Call her please. [address] [place]

## 2022-08-18 ENCOUNTER — PATIENT MESSAGE (OUTPATIENT)
Dept: INTERNAL MEDICINE CLINIC | Age: 27
End: 2022-08-18

## 2022-08-18 DIAGNOSIS — B96.89 BV (BACTERIAL VAGINOSIS): Primary | ICD-10-CM

## 2022-08-18 DIAGNOSIS — N76.0 BV (BACTERIAL VAGINOSIS): Primary | ICD-10-CM

## 2022-08-18 DIAGNOSIS — B37.31 RECURRENT CANDIDIASIS OF VAGINA: Primary | ICD-10-CM

## 2022-08-18 RX ORDER — METRONIDAZOLE 7.5 MG/G
1 GEL VAGINAL DAILY
Qty: 1 EACH | Refills: 1 | Status: SHIPPED | OUTPATIENT
Start: 2022-08-18 | End: 2022-08-23

## 2022-08-18 RX ORDER — FLUCONAZOLE 150 MG/1
150 TABLET ORAL WEEKLY
Qty: 8 TABLET | Refills: 0 | Status: SHIPPED | OUTPATIENT
Start: 2022-08-18 | End: 2022-08-19 | Stop reason: SDUPTHER

## 2022-08-18 NOTE — TELEPHONE ENCOUNTER
For your recurrent yeast infection, I have sent prescription of fluconazole 150mg, take 1 tablet every week for 8 weeks. I Have sent the prescription to the pharmacy.

## 2022-08-18 NOTE — TELEPHONE ENCOUNTER
Pt also asking for some type of cream to use externally. She wants a note to be off work today and tomorrow.      [employer]

## 2022-08-19 ENCOUNTER — OFFICE VISIT (OUTPATIENT)
Dept: INTERNAL MEDICINE CLINIC | Age: 27
End: 2022-08-19
Payer: MEDICAID

## 2022-08-19 VITALS
DIASTOLIC BLOOD PRESSURE: 64 MMHG | OXYGEN SATURATION: 99 % | SYSTOLIC BLOOD PRESSURE: 106 MMHG | BODY MASS INDEX: 36.66 KG/M2 | HEART RATE: 78 BPM | HEIGHT: 62 IN | TEMPERATURE: 97.8 F | WEIGHT: 199.2 LBS

## 2022-08-19 DIAGNOSIS — Z86.39 HISTORY OF THYROID DISORDER: ICD-10-CM

## 2022-08-19 DIAGNOSIS — N76.1 CHRONIC VULVOVAGINITIS: Primary | ICD-10-CM

## 2022-08-19 PROBLEM — H53.50 COLOR BLIND: Status: ACTIVE | Noted: 2022-08-19

## 2022-08-19 LAB
CONTROL: NORMAL
HBA1C MFR BLD: 5.2 %
PREGNANCY TEST URINE, POC: NORMAL
TSH REFLEX: 1.51 UIU/ML (ref 0.27–4.2)

## 2022-08-19 PROCEDURE — 81025 URINE PREGNANCY TEST: CPT | Performed by: STUDENT IN AN ORGANIZED HEALTH CARE EDUCATION/TRAINING PROGRAM

## 2022-08-19 PROCEDURE — 83036 HEMOGLOBIN GLYCOSYLATED A1C: CPT | Performed by: STUDENT IN AN ORGANIZED HEALTH CARE EDUCATION/TRAINING PROGRAM

## 2022-08-19 PROCEDURE — 36415 COLL VENOUS BLD VENIPUNCTURE: CPT | Performed by: STUDENT IN AN ORGANIZED HEALTH CARE EDUCATION/TRAINING PROGRAM

## 2022-08-19 PROCEDURE — 99213 OFFICE O/P EST LOW 20 MIN: CPT | Performed by: STUDENT IN AN ORGANIZED HEALTH CARE EDUCATION/TRAINING PROGRAM

## 2022-08-19 RX ORDER — FLUCONAZOLE 150 MG/1
150 TABLET ORAL WEEKLY
Qty: 4 TABLET | Refills: 0 | Status: SHIPPED | OUTPATIENT
Start: 2022-08-19 | End: 2022-09-10

## 2022-08-19 ASSESSMENT — ENCOUNTER SYMPTOMS
SORE THROAT: 0
NAUSEA: 0
ABDOMINAL PAIN: 0
VOMITING: 0
EYE REDNESS: 0
BACK PAIN: 0
RHINORRHEA: 0
COUGH: 0
EYE DISCHARGE: 0
CHEST TIGHTNESS: 0

## 2022-08-19 NOTE — LETTER
[facility]  [address]  Phone: [phone]  Fax: [phone]    Marcela Branham MD        August 19, 2022     Patient: Nadine Poag   YOB: 1995   Date of Visit: 8/19/2022       To Whom It May Concern:    Please excuse  Иван Linares from work from 8/18- 8/19. If you have any questions or concerns, please don't hesitate to call.     Sincerely,        Marcela Branham MD

## 2022-08-19 NOTE — PROGRESS NOTES
Иван Gustafson (:  1995) is a 33 y.o. female,Established patient, here for persistent vaginal itching and discharge. Last seen on  for a ED follow up for BV and candidiasis. During her last visit, she was advised to take 2 doses of fluconazole 72 hours apart as she was still having candidiasis. As per the patient, she was feeling better until she had her menstruation. Her LMP on 8/10, lasted for 5 days. Since then she has been noticing whitish discharge 7 constant pruritus. ASSESSMENT/PLAN:  1. Chronic vulvovaginitis  Prescribed her with fluconazole 150 mg weekly for 3 months. Will check for her diabetes and HIV in the setting of her recurrent candidiasis. Patient advised to hold the medication if she gets pregnant.  -     HIV Screen; Future  -     Hemoglobin A1C; Future  -     fluconazole (DIFLUCAN) 150 MG tablet; Take 1 tablet by mouth once a week for 12 doses, Disp-4 tablet, R-0Normal  -     POCT urine pregnancy    2. History of thyroid disorder  In the past, she was taking thyroid medication-patient is unaware if it is low or high. She had weight reducing surgery following which her thyroid medication was stopped. -     TSH with Reflex; Future    Subjective   SUBJECTIVE/OBJECTIVE:  HPI    Review of Systems   Constitutional:  Negative for chills, fatigue and fever. HENT:  Negative for congestion, ear pain, rhinorrhea and sore throat. Eyes:  Negative for discharge, redness and visual disturbance. Respiratory:  Negative for cough and chest tightness. Cardiovascular:  Negative for chest pain, palpitations and leg swelling. Gastrointestinal:  Negative for abdominal pain, nausea and vomiting. Endocrine: Negative. Genitourinary:  Positive for vaginal discharge. Negative for dysuria. Vaginal itching. Musculoskeletal:  Negative for back pain and gait problem. Skin: Negative.     Neurological:  Negative for syncope, facial asymmetry, speech difficulty, light-headedness and headaches. Objective   Physical Exam  Vitals reviewed. Constitutional:       Appearance: Normal appearance. HENT:      Head: Normocephalic. Eyes:      Extraocular Movements: Extraocular movements intact. Pupils: Pupils are equal, round, and reactive to light. Cardiovascular:      Rate and Rhythm: Normal rate. Heart sounds: Normal heart sounds. No murmur heard. Pulmonary:      Effort: Pulmonary effort is normal.      Breath sounds: Normal breath sounds. Abdominal:      General: Bowel sounds are normal.      Palpations: Abdomen is soft. Genitourinary:      Musculoskeletal:         General: Normal range of motion. Skin:     General: Skin is warm. Neurological:      General: No focal deficit present. Mental Status: She is alert and oriented to person, place, and time. Mental status is at baseline. Psychiatric:         Mood and Affect: Mood normal.        Please note that this chart was generated using dragon dictation software. Although every effort was made to ensure the accuracy of this automated transcription, some errors in transcription may have occurred. An electronic signature was used to authenticate this note.     --Caren Turcios MD

## 2022-08-20 LAB
HIV AG/AB: NORMAL
HIV ANTIGEN: NORMAL
HIV-1 ANTIBODY: NORMAL
HIV-2 AB: NORMAL

## 2022-09-21 ENCOUNTER — TELEPHONE (OUTPATIENT)
Dept: INTERNAL MEDICINE CLINIC | Age: 27
End: 2022-09-21

## 2022-09-21 NOTE — TELEPHONE ENCOUNTER
Pt  was tested for HIV a month ago. 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      Please call pt.

## 2022-09-22 DIAGNOSIS — Z77.21 EXPOSURE TO BLOOD OR BODY FLUID: Primary | ICD-10-CM

## 2022-10-03 ENCOUNTER — OFFICE VISIT (OUTPATIENT)
Dept: INTERNAL MEDICINE CLINIC | Age: 27
End: 2022-10-03
Payer: MEDICAID

## 2022-10-03 VITALS
DIASTOLIC BLOOD PRESSURE: 78 MMHG | BODY MASS INDEX: 37.4 KG/M2 | SYSTOLIC BLOOD PRESSURE: 116 MMHG | HEART RATE: 73 BPM | TEMPERATURE: 98 F | WEIGHT: 204.5 LBS | OXYGEN SATURATION: 100 %

## 2022-10-03 DIAGNOSIS — N89.8 VAGINAL ITCHING: Primary | ICD-10-CM

## 2022-10-03 DIAGNOSIS — R30.0 DYSURIA: ICD-10-CM

## 2022-10-03 LAB
BILIRUBIN, POC: NEGATIVE
BLOOD URINE, POC: NEGATIVE
CLARITY, POC: NORMAL
COLOR, POC: YELLOW
CONTROL: NEGATIVE
GLUCOSE URINE, POC: NEGATIVE
KETONES, POC: NEGATIVE
LEUKOCYTE EST, POC: NORMAL
NITRITE, POC: NEGATIVE
PH, POC: 7.5
PREGNANCY TEST URINE, POC: NEGATIVE
PROTEIN, POC: 30
SPECIFIC GRAVITY, POC: 1.02
UROBILINOGEN, POC: NEGATIVE

## 2022-10-03 PROCEDURE — 99214 OFFICE O/P EST MOD 30 MIN: CPT | Performed by: NURSE PRACTITIONER

## 2022-10-03 PROCEDURE — 81002 URINALYSIS NONAUTO W/O SCOPE: CPT | Performed by: NURSE PRACTITIONER

## 2022-10-03 PROCEDURE — 81025 URINE PREGNANCY TEST: CPT | Performed by: NURSE PRACTITIONER

## 2022-10-03 NOTE — PROGRESS NOTES
Date: 10/3/2022                                               Subjective/Objective:     Chief Complaint   Patient presents with    Dysuria     Has had burning and frequency       HPI    Prisca Reich is a 32 yo female, visit today for dysuria. Symptoms began about one week ago, have been gradually worsening. She complains of associated urinary frequency and hesitancy. Denies fever/chills. Has some nausea, no vomiting. She does have flank pain that began two days ago. She is sexually active. No known exposure to STI. She complains of vaginal itching and vaginal discharge, has chronic vulvovaginitis - was being treated with weekly diflucan for 3 months however has not taken in one month. LMP 9/7/2022  BCM: none           Patient Active Problem List    Diagnosis Date Noted    Color blind 08/19/2022    Dizziness and giddiness 79/17/4578    Complicated pregnancy, third trimester 04/24/2019    Pregnancy examination or test, negative result 03/23/2019       Past Medical History:   Diagnosis Date    Dizziness and giddiness 4/27/2019    E coli enteritis     Headache     Premature birth        Past Surgical History:   Procedure Laterality Date    ABDOMEN SURGERY  1996    bowel resection    ADENOIDECTOMY      SMALL INTESTINE SURGERY      TONSILLECTOMY         Office Visit on 08/19/2022   Component Date Value Ref Range Status    Preg Test, Ur 08/19/2022 neg   Final    TSH 08/19/2022 1.51  0.27 - 4.20 uIU/mL Final    HIV Ag/Ab 08/19/2022 Non-Reactive  Non-reactive Final    HIV-1 Antibody 08/19/2022 Non-Reactive  Non-reactive Final    HIV ANTIGEN 08/19/2022 Non-Reactive  Non-reactive Final    HIV-2 Ab 08/19/2022 Non-Reactive  Non-reactive Final    Hemoglobin A1C 08/19/2022 5.2  % Final       No family history on file. No current outpatient medications on file. No current facility-administered medications for this visit.        No Known Allergies    Review of Systems   Constitutional:  Negative for chills and fever.   Genitourinary:  Positive for dysuria, frequency and vaginal discharge. Negative for decreased urine volume and pelvic pain. Vitals:  /78 (Site: Left Upper Arm, Position: Sitting, Cuff Size: Large Adult)   Pulse 73   Temp 98 °F (36.7 °C) (Oral)   Wt 204 lb 8 oz (92.8 kg)   SpO2 100%   BMI 37.40 kg/m²     Physical Exam  Vitals reviewed. Exam conducted with a chaperone present (Dena Christian student NP). Constitutional:       General: She is not in acute distress. HENT:      Head: Normocephalic and atraumatic. Pulmonary:      Effort: Pulmonary effort is normal.   Abdominal:      Tenderness: There is no right CVA tenderness or left CVA tenderness. Genitourinary:     Labia:         Right: No lesion. Left: No lesion. Vagina: Vaginal discharge present. Cervix: Discharge present. No friability or erythema. Adnexa:         Left: No tenderness. Comments: Thick white discharge   Skin:     General: Skin is warm and dry. Neurological:      General: No focal deficit present. Mental Status: She is alert and oriented to person, place, and time. Psychiatric:         Mood and Affect: Mood normal.         Behavior: Behavior normal.         Thought Content: Thought content normal.         Judgment: Judgment normal.       Assessment/Plan     1. Dysuria: with urinary frequency. POC UA small leuks. UPT negative.   - C.trachomatis N.gonorrhoeae DNA, Urine; Future  - POCT Urinalysis no Micro  - POCT urine pregnancy  - Culture, Urine   - Will send for culture and treat accordingly. - Urine for GC/CT.    - Return precautions advised    2. Vaginal itching: being treated for chronic vulvovaginitis however has not taken diflucan in about one month. Also with history of bacterial vaginosis. Affirm swab. Compliance with diflucan encouraged.    - VAGINAL PATHOGENS PROBE *A    Orders Placed This Encounter   Procedures    C.trachomatis N.gonorrhoeae DNA, Urine     Standing Status:   Future     Standing Expiration Date:   10/3/2023    Culture, Urine     Order Specific Question:   Specify (ex-cath, midstream, cysto, etc)? Answer:   midstream    VAGINAL PATHOGENS PROBE *A    POCT Urinalysis no Micro    POCT urine pregnancy       Return if symptoms worsen or fail to improve. OR sooner with questions, concerns, worsening symptoms    JESUS DODGE  10/3/2022  2:20 PM    Discussed use, benefit, and side effects of prescribed medications. Barriers to medication compliance addressed. Discussed all ordered testing and labs. All patient questions answered. Patient agreeable with plan above. Please note that this chart was generated using dragon dictation software. Although every effort was made to ensure the accuracy of this automated transcription, some errors in transcription may have occurred.

## 2022-10-04 DIAGNOSIS — B96.89 BACTERIAL VAGINOSIS: Primary | ICD-10-CM

## 2022-10-04 DIAGNOSIS — N76.0 BACTERIAL VAGINOSIS: Primary | ICD-10-CM

## 2022-10-04 LAB
C. TRACHOMATIS DNA ,URINE: NEGATIVE
CANDIDA SPECIES, DNA PROBE: ABNORMAL
GARDNERELLA VAGINALIS, DNA PROBE: ABNORMAL
N. GONORRHOEAE DNA, URINE: NEGATIVE
TRICHOMONAS VAGINALIS DNA: ABNORMAL
URINE CULTURE, ROUTINE: NORMAL

## 2022-10-04 RX ORDER — METRONIDAZOLE 500 MG/1
500 TABLET ORAL 2 TIMES DAILY
Qty: 14 TABLET | Refills: 0 | Status: SHIPPED | OUTPATIENT
Start: 2022-10-04 | End: 2022-10-11

## 2022-10-11 ENCOUNTER — OFFICE VISIT (OUTPATIENT)
Dept: INTERNAL MEDICINE CLINIC | Age: 27
End: 2022-10-11
Payer: MEDICAID

## 2022-10-11 VITALS
HEART RATE: 80 BPM | OXYGEN SATURATION: 99 % | WEIGHT: 210.6 LBS | TEMPERATURE: 97.9 F | BODY MASS INDEX: 38.76 KG/M2 | DIASTOLIC BLOOD PRESSURE: 59 MMHG | SYSTOLIC BLOOD PRESSURE: 97 MMHG | HEIGHT: 62 IN

## 2022-10-11 DIAGNOSIS — B96.89 BACTERIAL VAGINOSIS: ICD-10-CM

## 2022-10-11 DIAGNOSIS — E66.9 OBESITY (BMI 35.0-39.9 WITHOUT COMORBIDITY): ICD-10-CM

## 2022-10-11 DIAGNOSIS — N76.0 BACTERIAL VAGINOSIS: ICD-10-CM

## 2022-10-11 DIAGNOSIS — N62 MACROMASTIA: Primary | ICD-10-CM

## 2022-10-11 DIAGNOSIS — L98.7 EXCESS SKIN OF ARM: ICD-10-CM

## 2022-10-11 PROCEDURE — 99214 OFFICE O/P EST MOD 30 MIN: CPT | Performed by: STUDENT IN AN ORGANIZED HEALTH CARE EDUCATION/TRAINING PROGRAM

## 2022-10-11 RX ORDER — FLUCONAZOLE 150 MG/1
TABLET ORAL
COMMUNITY
Start: 2022-10-03

## 2022-10-11 RX ORDER — METRONIDAZOLE 7.5 MG/G
GEL TOPICAL
Qty: 45 G | Refills: 1 | Status: SHIPPED | OUTPATIENT
Start: 2022-10-11

## 2022-10-11 ASSESSMENT — ENCOUNTER SYMPTOMS
CHEST TIGHTNESS: 0
BACK PAIN: 0
VOMITING: 0
RHINORRHEA: 0
SORE THROAT: 0
NAUSEA: 0
EYE DISCHARGE: 0
COUGH: 0
ABDOMINAL PAIN: 0
EYE REDNESS: 0

## 2022-10-11 NOTE — PROGRESS NOTES
Иван Wise (:  1995) is a 33 y.o. female,Established patient, here to discuss about her breast reduction and medication management. ASSESSMENT/PLAN:    1. Macromastia-associated with back pain. She had her gastric sleeve surgery done at [facility] around , since then she has lost approximately 100 pounds weight. She would like to get breast reduction surgery. -     [address], Sara Kathleen, CNP, Plastic Surgery, [facility]    2. Excess skin of arm  -     [facility], Sara Kathleen, CNP, Plastic Surgery, [facility]    3. Obesity (BMI 35.0-39.9 without comorbidity)-counseled on diet and exercise. -     [address], Saraeber Kathleen, CNP, Plastic Surgery, [facility]    4. Bacterial vaginosis-history of recurrent bacterial vaginosis. Not tolerate oral metronidazole in the setting of vomiting.  -     H/o  BV and candidiasis. Was prescribed a medication for vulvovaginitis but was not compliant with her medication. She then had an appointment with [facility] on 10/3 for vaginal itching. Repeat vaginal swab continue to show positive for Gardnerella and Candida infection. She continue with the fluconazole but was not taking metronidazole. metroNIDAZOLE (METROGEL) 0.75 % gel; Apply topically 2 times daily. , Disp-45 g, R-1, Normal    SUBJECTIVE/OBJECTIVE:  Other  Pertinent negatives include no abdominal pain, chest pain, chills, congestion, coughing, fatigue, fever, headaches, nausea, sore throat or vomiting. Review of Systems   Constitutional:  Negative for chills, fatigue and fever. HENT:  Negative for congestion, ear pain, rhinorrhea and sore throat. Eyes:  Negative for discharge, redness and visual disturbance. Respiratory:  Negative for cough and chest tightness. Cardiovascular:  Negative for chest pain, palpitations and leg swelling. Gastrointestinal:  Negative for abdominal pain, nausea and vomiting. Endocrine: Negative.     Genitourinary:  Positive for vaginal discharge. Negative for dysuria. Vaginal itching. Musculoskeletal:  Negative for back pain and gait problem. Skin: Negative. Neurological:  Negative for syncope, facial asymmetry, speech difficulty, light-headedness and headaches. Objective   Physical Exam  Vitals reviewed. Constitutional:       Appearance: Normal appearance. HENT:      Head: Normocephalic. Eyes:      Extraocular Movements: Extraocular movements intact. Pupils: Pupils are equal, round, and reactive to light. Cardiovascular:      Rate and Rhythm: Normal rate. Heart sounds: Normal heart sounds. No murmur heard. Pulmonary:      Effort: Pulmonary effort is normal.      Breath sounds: Normal breath sounds. Abdominal:      General: Bowel sounds are normal.      Palpations: Abdomen is soft. Musculoskeletal:         General: Normal range of motion. Skin:     General: Skin is warm. Neurological:      General: No focal deficit present. Mental Status: She is alert and oriented to person, place, and time. Mental status is at baseline. Psychiatric:         Mood and Affect: Mood normal.        Please note that this chart was generated using dragon dictation software. Although every effort was made to ensure the accuracy of this automated transcription, some errors in transcription may have occurred. An electronic signature was used to authenticate this note.     --Timothy Sales MD

## 2022-10-13 ENCOUNTER — OFFICE VISIT (OUTPATIENT)
Dept: SURGERY | Age: 27
End: 2022-10-13
Payer: MEDICAID

## 2022-10-13 VITALS
TEMPERATURE: 97.3 F | DIASTOLIC BLOOD PRESSURE: 70 MMHG | SYSTOLIC BLOOD PRESSURE: 100 MMHG | BODY MASS INDEX: 37.49 KG/M2 | OXYGEN SATURATION: 98 % | WEIGHT: 205 LBS | HEART RATE: 80 BPM

## 2022-10-13 DIAGNOSIS — M79.3 PANNICULITIS: ICD-10-CM

## 2022-10-13 DIAGNOSIS — N62 MACROMASTIA: Primary | ICD-10-CM

## 2022-10-13 PROCEDURE — 99204 OFFICE O/P NEW MOD 45 MIN: CPT

## 2022-10-13 NOTE — PROGRESS NOTES
[facility]    Consultation requested by: Christian Self MD    CC: Body contouring    HPI: 33 y.o. female with a PMHx as delineated below who presents in consultation for body contouring. She wishes to discuss surgical options after losing a significant amount of weight. Bariatric surgery: Yes / date: 2020 (Type: sleeve gastrectomy)    Max weight (lbs): 300 lb  Lowest weight (lbs): 198 lb  Current (lbs): 205 lb  Weight change in the past 3 months: Yes  Max BMI: 55  Current BMI: Body mass index is 37.49 kg/m².     History of DVT/PE: Yes  Excess skin cover genitals: No    Age of obesity onset: 24years old   Previous body contouring procedures: No   Smoking: No       Last Mammogram: n/a     Current bra size: 38 DD  Desired bra size: \"small as possible\"  Pregnancies/miscarriages: 2/1  Breast feeding: no future plans    Breast Symptoms:    Macromastia Symptoms:  Upper back pain: Yes      Bra strap grooves: Yes      Wears supportive bras (>1 yr): Yes      Tried conservative measures (PT, MDs,etc): Yes, MD for back pain      Intertrigo: Yes      Head/neck pain: Yes      Headaches: Yes      Paresthesias of hands/fingers: Yes    Pregnancy / Miscarriage: 2/1    Past Medical History:   Diagnosis Date    Dizziness and giddiness 4/27/2019    E coli enteritis     Headache     Premature birth      Past Surgical History:   Procedure Laterality Date    ABDOMEN SURGERY  1996    bowel resection    ADENOIDECTOMY      SMALL INTESTINE SURGERY      STOMACH SURGERY  2020    Gastric Sleeve    TONSILLECTOMY       Social History     Socioeconomic History    Marital status: Single     Spouse name: Not on file    Number of children: Not on file    Years of education: Not on file    Highest education level: Not on file   Occupational History    Not on file   Tobacco Use    Smoking status: Never    Smokeless tobacco: Never   Substance and Sexual Activity    Alcohol use: No    Drug use: No    Sexual activity: Yes Partners: Male   Other Topics Concern    Not on file   Social History Narrative    Not on file     Social Determinants of Health     Financial Resource Strain: Low Risk     Difficulty of Paying Living Expenses: Not hard at all   Food Insecurity: No Food Insecurity    Worried About [address] in the Last Year: Never true    [address] [facility] [address] in the Last Year: Never true   Transportation Needs: No Transportation Needs    Lack of Transportation (Medical): No    Lack of Transportation (Non-Medical): No   Physical Activity: Not on file   Stress: Not on file   Social Connections: Not on file   Intimate Partner Violence: Not on file   Housing Stability: Not on file     No family history on file. Allergy: No Known Allergies    ROS History obtained from the patient  General ROS: negative  Psychological ROS: negative  Ophthalmic ROS: negative  Neurological ROS: negative  ENT ROS: negative  Allergy and Immunology ROS: negative  Hematological and Lymphatic ROS: negative  Endocrine ROS: negative  Respiratory ROS: negative  Cardiovascular ROS: negative  Gastrointestinal ROS: See HPI  Genito-Urinary ROS: negative  Musculoskeletal ROS: negative   Skin ROS: See HPI.     EXAM    /70   Pulse 80   Temp 97.3 °F (36.3 °C)   Wt 205 lb (93 kg)   LMP 10/04/2022 (Exact Date)   SpO2 98%   BMI 37.49 kg/m²      GEN: NAD, pleasant  CVS: RRR  PULM: No respiratory distress  HEENT: PERRLA/EOMI, hearing appears within normal limits  NECK: Supple with trachea in midline, no masses  BREAST: Right larger than Left   R  Ptosis ndgndrndanddndend:nd nd2nd Palpable masses: No     Nipple retraction: No     Palpable axillary lymphadenopathy: No     SN-N: 35 cm     N-IMF: 14 cm     Breast width: 18 cm           L  Ptosis ndgndrndanddndend:nd nd2nd Palpable masses: No     Nipple retraction: No     Palpable axillary lymphadenopathy: No     SN-N: 35 cm     N-IMF: 11 cm     Breast width: 18 cm       ABD: soft/NT/ND, grade 2 pannus  EXT: No lymphedema noted  NEURO: No focal deficits, no obvious CN deficits    Estimated Reduction Volume (Schnur scale): 404 grams each breast    IMP: 33 y.o. female with desire for body contouring  PLAN: . We discussed multiple options including mastopexy verses BBR. Explained to patient with her lack of breast volume with a BBR she may be very small. She states she doesn't care how small she is, she just wishes to have symptoms of back pain and discomfort eliminated. Discussed panniculectomy with abdominoplasty/[id number] abdominoplasty, we discussed brachioplasty. Informed patient we will would need her BMI to be 35 or less to offer surgery option. She will follow up in 1 month for a weight check with Dr. Carlyle Mejia. The complexity of body contouring procedures and wound healing physiology were discussed with the patient. She was counseled on ideal medical optimization (nutrition, weight stability, etc.). We also discussed the pros & cons of staging for multiple procedures including controlling tension from various sites and postoperative care managment. Clinical photos were obtained. The risks, benefits, alternatives, outcomes, personnel involved were discussed and all questions were answered in a satisfactory manner according to the patient. Specifically,the risks including, but not limited to: bleeding possibly requiring transfusion orreoperation, infection, seroma, nonhealing of wounds, poor cosmetic outcome, scarring, VTE (DVT/PE), and death were discussed.       Total encounter time: 50 min with > 50% spent with face to face (or vitual) counseling and coordination of care    Tahnh LEE-CNP  [address] [facility]  [phone]  10/13/22

## 2022-10-17 DIAGNOSIS — N76.0 BACTERIAL VAGINOSIS: ICD-10-CM

## 2022-10-17 DIAGNOSIS — B96.89 BACTERIAL VAGINOSIS: ICD-10-CM

## 2022-10-18 RX ORDER — METRONIDAZOLE 7.5 MG/G
GEL TOPICAL
Qty: 45 G | OUTPATIENT
Start: 2022-10-18

## 2022-12-07 ENCOUNTER — TELEMEDICINE (OUTPATIENT)
Dept: INTERNAL MEDICINE CLINIC | Age: 27
End: 2022-12-07
Payer: MEDICAID

## 2022-12-07 ENCOUNTER — OFFICE VISIT (OUTPATIENT)
Dept: SURGERY | Age: 27
End: 2022-12-07
Payer: MEDICAID

## 2022-12-07 VITALS
OXYGEN SATURATION: 99 % | HEART RATE: 88 BPM | HEIGHT: 62 IN | TEMPERATURE: 97.7 F | SYSTOLIC BLOOD PRESSURE: 116 MMHG | WEIGHT: 205.8 LBS | DIASTOLIC BLOOD PRESSURE: 77 MMHG | BODY MASS INDEX: 37.87 KG/M2

## 2022-12-07 DIAGNOSIS — B37.31 VULVOVAGINAL CANDIDIASIS: Primary | ICD-10-CM

## 2022-12-07 DIAGNOSIS — N89.8 VAGINAL DISCHARGE: ICD-10-CM

## 2022-12-07 DIAGNOSIS — N62 MACROMASTIA: Primary | ICD-10-CM

## 2022-12-07 DIAGNOSIS — B96.89 BACTERIAL VAGINOSIS: ICD-10-CM

## 2022-12-07 DIAGNOSIS — M79.3 PANNICULITIS: ICD-10-CM

## 2022-12-07 DIAGNOSIS — N76.0 BACTERIAL VAGINOSIS: ICD-10-CM

## 2022-12-07 PROCEDURE — 99213 OFFICE O/P EST LOW 20 MIN: CPT | Performed by: SURGERY

## 2022-12-07 PROCEDURE — 99214 OFFICE O/P EST MOD 30 MIN: CPT | Performed by: INTERNAL MEDICINE

## 2022-12-07 RX ORDER — FLUCONAZOLE 150 MG/1
TABLET ORAL
Qty: 27 TABLET | Refills: 0 | Status: SHIPPED | OUTPATIENT
Start: 2022-12-07 | End: 2022-12-07

## 2022-12-07 RX ORDER — DULOXETIN HYDROCHLORIDE 30 MG/1
30 CAPSULE, DELAYED RELEASE ORAL DAILY
COMMUNITY
Start: 2022-06-03

## 2022-12-07 RX ORDER — METRONIDAZOLE 7.5 MG/G
GEL TOPICAL NIGHTLY
Qty: 45 G | Refills: 1 | Status: SHIPPED | OUTPATIENT
Start: 2022-12-07 | End: 2022-12-07

## 2022-12-07 ASSESSMENT — ENCOUNTER SYMPTOMS
SHORTNESS OF BREATH: 0
VOMITING: 0
ABDOMINAL PAIN: 0
NAUSEA: 0
SORE THROAT: 0
COUGH: 0
BLOOD IN STOOL: 0

## 2022-12-07 NOTE — PROGRESS NOTES
Иван Renteria (:  1995) is a New patient, here for evaluation of the following:    Assessment & Plan   Below is the assessment and plan developed based on review of pertinent history, physical exam, labs, studies, and medications. 1. Vulvovaginal candidiasis  Chronic, recurrent  Patient was treated with oral fluconazole for 3 months recently, symptoms recurred few days ago. Complains of white vaginal discharge  Advised patient to complete self administered vaginal swab testing for candida, gardnerella and trichomonas tomorrow  If patient tests positive for candida, she would benefit by taking oral fluconazole induction therapy followed by maintenance therapy for 6 months. Advised patient that she may consult with her gynecologist before starting long term  fluconazole therapy. Prescribed topical nystatin-triamcinolone to apply over vaginal rash after providing vaginal swab for testing        - nystatin-triamcinolone (MYCOLOG II) 436180-3.1 UNIT/GM-% cream; Apply topically 2 times daily. , Disp-30 g, R-0, Normal  -     VAGINAL PATHOGENS PROBE *A; Future  2. Bacterial vaginosis  Chronic, recurrent  Patient was treated with topical metronidazole recently for recurrent bacterial vaginosis  She complains of malodorous vaginal discharge in the past few days   Advised patient to complete self administered vaginal swab testing for candida, gardnerella and trichomonas tomorrow   If patient tests positive for Gardnerella, she would benefit by using topical metronidazole vaginal gel twice daily for 5 days followed by maintenance therapy for 6 months  -     metroNIDAZOLE (METROGEL) 0.75 % gel; Apply topically at bedtime for 5 days After initial treatment, use twice weekly for 3 to 6 months, Topical, Nightly Starting 2022, Until 2022, For 5 days, Disp-45 g, R-1, Normal  -     VAGINAL PATHOGENS PROBE *A; Future  3.  Vaginal discharge  Advised patient to complete self administered vaginal swab testing for candida, gardnerella and trichomonas tomorrow  Will treat based on the results  -     Trichomonas by EIA; Future    Patient denies any sexual contact after testing negative for chlamydia and gonorrhea in June 2022 and refused testing for chlamydia and gonorrhea today. Her LMP was December 3, 2022. HIV, Hepatitis B, Hepatitis C testing were negative in 09/23/2022 except for Hepatitis B surface antibody (probably immune from vaccination)     Reviewed lab results through care everywhere. Return if symptoms worsen or fail to improve. Subjective   Vaginal Discharge  The patient's primary symptoms include genital itching, a genital odor, a genital rash and vaginal discharge. This is a recurrent problem. The current episode started in the past 7 days. The problem occurs constantly. The problem has been gradually worsening. The pain is moderate. The problem affects both sides. She is not pregnant. Pertinent negatives include no abdominal pain, fever, hematuria, nausea, sore throat, urgency or vomiting. She has tried nothing for the symptoms. She is not sexually active. Her past medical history is significant for vaginosis. Review of Systems   Constitutional:  Negative for fatigue and fever. HENT:  Negative for nosebleeds and sore throat. Respiratory:  Negative for cough and shortness of breath. Cardiovascular:  Negative for chest pain, palpitations and leg swelling. Gastrointestinal:  Negative for abdominal pain, blood in stool, nausea and vomiting. Genitourinary:  Positive for vaginal discharge. Negative for hematuria and urgency. Neurological:  Negative for dizziness and weakness.         Objective   Patient-Reported Vitals  No data recorded     Physical Exam  [INSTRUCTIONS:  \"[x]\" Indicates a positive item  \"[]\" Indicates a negative item  -- DELETE ALL ITEMS NOT EXAMINED]    Constitutional: [x] Appears well-developed and well-nourished [x] No apparent distress      [] Abnormal -     Mental status: [x] Alert and awake  [x] Oriented to person/place/time [x] Able to follow commands    [] Abnormal -     Eyes:   EOM    [x]  Normal    [] Abnormal -   Sclera  [x]  Normal    [] Abnormal -          Discharge [x]  None visible   [] Abnormal -     HENT: [x] Normocephalic, atraumatic  [] Abnormal -   [] Mouth/Throat: Mucous membranes are moist    External Ears [x] Normal  [] Abnormal -    Neck: [x] No visualized mass [] Abnormal -     Pulmonary/Chest: [x] Respiratory effort normal   [x] No visualized signs of difficulty breathing or respiratory distress        [] Abnormal -      Musculoskeletal:   [] Normal gait with no signs of ataxia         [x] Normal range of motion of neck        [] Abnormal -     Neurological:        [x] No Facial Asymmetry (Cranial nerve 7 motor function) (limited exam due to video visit)          [x] No gaze palsy        [] Abnormal -          Skin:        [x] No significant exanthematous lesions or discoloration noted on facial skin         [] Abnormal -            Psychiatric:       [x] Normal Affect [] Abnormal -        [x] No Hallucinations    Other pertinent observable physical exam findings:-         On this date 12/7/2022 I have spent 40 minutes reviewing previous notes, test results and face to face (virtual) with the patient discussing the diagnosis and importance of compliance with the treatment plan as well as documenting on the day of the visit. [employer], was evaluated through a synchronous (real-time) audio-video encounter. The patient (or guardian if applicable) is aware that this is a billable service, which includes applicable co-pays. This Virtual Visit was conducted with patient's (and/or legal guardian's) consent. The visit was conducted pursuant to the emergency declaration under the [facility], [address] [place] [address] authority and the [employer] and [employer] Act.   Patient identification was verified, and a caregiver was present when appropriate. The patient was located at Home: [address]  [address] [address].    Provider was located at Home ([place] [address]): Shaneka Davis MD

## 2022-12-07 NOTE — PROGRESS NOTES
[facility]     Consultation requested by: Anastacia Vallejo MD     CC: Body contouring     HPI: 33 y.o. female with a PMHx as delineated below who presents in consultation for body contouring. She wishes to discuss surgical options after losing a significant amount of weight. Since her last evaluation with Gordo Poisson NP, she states that she has not lost any weight. She has lost approximately 100 lbs since her sleeve in 2020. She notes that she has had rashes beneath her pannus with extensive itching. She notes that there is bleeding from beneath her pannus. Bariatric surgery: Yes / date: 2020  (Type: sleeve gastrectomy)     Max weight (lbs): 300 lb  Lowest weight (lbs): 198 lb  Current (lbs): 205 (205 lb)  Weight change in the past 3 months: Yes  Max BMI: 55  Current BMI: 37.5 (37.5). History of DVT/PE: Yes  Excess skin cover genitals: No     Age of obesity onset: 24years old   Previous body contouring procedures: No             Smoking: No                   Last Mammogram: n/a      Current bra size: 38 DD  Desired bra size: \"small as possible\"  Pregnancies/miscarriages: 2/1  Breast feeding: no future plans     Breast Symptoms:     Macromastia Symptoms:     Upper back pain: Yes                                                  Bra strap grooves: Yes                                                  Wears supportive bras (>1 yr): Yes                                                  Tried conservative measures (PT, MDs,etc): Yes, MD for back pain                                                  Intertrigo: Yes                                                  Head/neck pain: Yes                                                  Headaches:  Yes                                                  Paresthesias of hands/fingers: Yes     Pregnancy / Miscarriage: 2/1     Past Medical History        Past Medical History:   Diagnosis Date    Dizziness and giddiness 4/27/2019    E coli enteritis      Headache      Premature birth           Past Surgical History         Past Surgical History:   Procedure Laterality Date    ABDOMEN SURGERY   1996     bowel resection    ADENOIDECTOMY        SMALL INTESTINE SURGERY        STOMACH SURGERY   2020     Gastric Sleeve    TONSILLECTOMY             Social History               Socioeconomic History    Marital status: Single       Spouse name: Not on file    Number of children: Not on file    Years of education: Not on file    Highest education level: Not on file   Occupational History    Not on file   Tobacco Use    Smoking status: Never    Smokeless tobacco: Never   Substance and Sexual Activity    Alcohol use: No    Drug use: No    Sexual activity: Yes       Partners: Male   Other Topics Concern    Not on file   Social History Narrative    Not on file      Social Determinants of Health          Financial Resource Strain: Low Risk     Difficulty of Paying Living Expenses: Not hard at all   Food Insecurity: No Food Insecurity    Worried About Running Out of Food in the Last Year: Never true    [address] in the Last Year: Never true   Transportation Needs: No Transportation Needs    Lack of Transportation (Medical): No    Lack of Transportation (Non-Medical): No   Physical Activity: Not on file   Stress: Not on file   Social Connections: Not on file   Intimate Partner Violence: Not on file   Housing Stability: Not on file         Family History   No family history on file. Allergy: No Known Allergies     ROS History obtained from the patient  General ROS: negative  Psychological ROS: negative  Ophthalmic ROS: negative  Neurological ROS: negative  ENT ROS: negative  Allergy and Immunology ROS: negative  Hematological and Lymphatic ROS: negative  Endocrine ROS: negative  Respiratory ROS: negative  Cardiovascular ROS: negative  Gastrointestinal ROS: See HPI  Genito-Urinary ROS: negative  Musculoskeletal ROS: negative   Skin ROS: See HPI.      EXAM /77   Pulse 88   Temp 97.7 °F (36.5 °C)   Ht 5' 2\" (1.575 m)   Wt 205 lb 12.8 oz (93.4 kg)   SpO2 99%   BMI 37.64 kg/m²      GEN: NAD, pleasant  CVS: RRR  PULM: No respiratory distress  HEENT: PERRLA/EOMI, hearing appears within normal limits  NECK: Supple with trachea in midline, no masses  BREAST: Right larger than Left              R                      Ptosis thgthrthathdtheth:th th4th Palpable masses: No                                      Nipple retraction: No                                      Palpable axillary lymphadenopathy: No                                      SN-N: 35 cm                                      N-IMF: 14 cm                                      Breast width: 18 cm                                                                  L                      Ptosis thgthrthathdtheth:th th4th Palpable masses: No                                      Nipple retraction: No                                      Palpable axillary lymphadenopathy: No                                      SN-N: 35 cm                                      N-IMF: 11 cm                                      Breast width: 18 cm                                        ABD: soft/NT/ND, grade 2 pannus  EXT: No lymphedema noted  NEURO: No focal deficits, no obvious CN deficits     Estimated Reduction Volume (Schnur scale): 404 grams (each)     IMP: 33 y.o. female with desire for body contouring  PLAN: . We discussed multiple options including mastopexy verses BBR. She desires to be smaller and understands the difference. Thus, will plan for breast reduction as well as panniculectomy and possible abdominoplasty. She is going to work to decrease her weight toward goal and then return for evaluation. Once weight stability set, will then submit to insurance and schedule. The complexity of body contouring procedures and wound healing physiology were discussed with the patient. She was counseled on ideal medical optimization (nutrition, weight stability, etc.). We also discussed the pros & cons of staging for multiple procedures including controlling tension from various sites and postoperative care managment. Clinical photos were obtained. The risks, benefits, alternatives, outcomes, personnel involved were discussed and all questions were answered in a satisfactory manner according to the patient. Specifically,the risks including, but not limited to: bleeding possibly requiring transfusion orreoperation, infection, seroma, nonhealing of wounds, poor cosmetic outcome, scarring, VTE (DVT/PE), and death were discussed.        Fatou Dotson MD  [address] [place] [address] Reconstructive Surgery  [phone]  12/07/22

## 2023-01-30 ASSESSMENT — ENCOUNTER SYMPTOMS
BACK PAIN: 0
NAUSEA: 0
SORE THROAT: 0
EYE DISCHARGE: 0
EYE REDNESS: 0
ABDOMINAL PAIN: 0
COUGH: 0
CHEST TIGHTNESS: 0
VOMITING: 0
RHINORRHEA: 0

## 2023-01-30 NOTE — PROGRESS NOTES
Иван Mcneil (:  1995) is a 33 y.o. female,Established patient, here to discuss about her breast reduction and medication management. She has a history of recurrent vulvovaginitis, bacterial vaginosis and candidiasis She has not tolerated oral metronidazole due to vomiting, tolerating pv gel. --- - gastric sleeve surgery at [facility]. Around 100 pounds 2 years ago. For macromastia along with excess skin in the arm, seen by plastic surgeon, patient needs to lose weight prior to getting the surgery. ASSESSMENT/PLAN:    1. Dysuria-new, will send for the UA and treat as needed. -     Urinalysis with Microscopic  -     C.trachomatis N.gonorrhoeae DNA, Urine; Future  2. Foul smelling urine along with the vaginal discharge, concern for STD and UTI. Will get labs. -     Urinalysis with Microscopic  -     C.trachomatis N.gonorrhoeae DNA, Urine; Future  3. Microcytic anemia-labs reviewed from [facility], will check her levels again along with iron study. -     CBC with Auto Differential; Future  -     Ferritin; Future  -     Iron and TIBC; Future  -     Basic Metabolic Panel; Future    Patient has requested for antifungal agent if she is started on antibiotic. SUBJECTIVE/OBJECTIVE:  Other  This is a recurrent problem. Episode onset: Since. The problem occurs constantly. Pertinent negatives include no abdominal pain, chest pain, chills, congestion, coughing, fatigue, fever, headaches, nausea, sore throat or vomiting. Dysuria   This is a new problem. The current episode started in the past 7 days. The problem has been gradually worsening. The quality of the pain is described as shooting. The pain is moderate. There has been no fever. She is Sexually active. There is No history of pyelonephritis. Associated symptoms include hesitancy. Pertinent negatives include no chills, hematuria, nausea or vomiting. She has tried nothing for the symptoms.      Review of Systems Constitutional:  Negative for chills, fatigue and fever. HENT:  Negative for congestion, ear pain, rhinorrhea and sore throat. Eyes:  Negative for discharge, redness and visual disturbance. Respiratory:  Negative for cough and chest tightness. Cardiovascular:  Negative for chest pain, palpitations and leg swelling. Gastrointestinal:  Negative for abdominal pain, nausea and vomiting. Endocrine: Negative. Genitourinary:  Positive for dysuria, hesitancy and vaginal discharge. Negative for hematuria. Vaginal itching. Musculoskeletal:  Negative for back pain and gait problem. Skin: Negative. Neurological:  Negative for syncope, facial asymmetry, speech difficulty, light-headedness and headaches. Objective   Physical Exam  Vitals reviewed. Constitutional:       Appearance: Normal appearance. HENT:      Head: Normocephalic. Eyes:      Extraocular Movements: Extraocular movements intact. Pupils: Pupils are equal, round, and reactive to light. Cardiovascular:      Rate and Rhythm: Normal rate. Heart sounds: Normal heart sounds. No murmur heard. Pulmonary:      Effort: Pulmonary effort is normal.      Breath sounds: Normal breath sounds. Abdominal:      General: Bowel sounds are normal.      Palpations: Abdomen is soft. Musculoskeletal:         General: Normal range of motion. Skin:     General: Skin is warm. Neurological:      General: No focal deficit present. Mental Status: She is alert and oriented to person, place, and time. Mental status is at baseline. Psychiatric:         Mood and Affect: Mood normal.        Please note that this chart was generated using dragon dictation software. Although every effort was made to ensure the accuracy of this automated transcription, some errors in transcription may have occurred. An electronic signature was used to authenticate this note.     --Jaci Lopez MD

## 2023-01-31 ENCOUNTER — OFFICE VISIT (OUTPATIENT)
Dept: INTERNAL MEDICINE CLINIC | Age: 28
End: 2023-01-31
Payer: MEDICAID

## 2023-01-31 VITALS
OXYGEN SATURATION: 99 % | HEART RATE: 69 BPM | SYSTOLIC BLOOD PRESSURE: 93 MMHG | BODY MASS INDEX: 36.51 KG/M2 | TEMPERATURE: 97.9 F | WEIGHT: 199.6 LBS | DIASTOLIC BLOOD PRESSURE: 58 MMHG

## 2023-01-31 DIAGNOSIS — R82.90 FOUL SMELLING URINE: ICD-10-CM

## 2023-01-31 DIAGNOSIS — R30.0 DYSURIA: Primary | ICD-10-CM

## 2023-01-31 DIAGNOSIS — D50.9 MICROCYTIC ANEMIA: ICD-10-CM

## 2023-01-31 LAB
AMORPHOUS: PRESENT
BACTERIA: ABNORMAL /HPF
BILIRUBIN URINE: NEGATIVE
BLOOD, URINE: ABNORMAL
CLARITY UR: ABNORMAL
COLOR UR: YELLOW
EPITHELIAL CELLS, UA: 9 /HPF (ref 0–5)
GLUCOSE URINE: NEGATIVE MG/DL
HYALINE CASTS: 0 /LPF (ref 0–8)
KETONES, URINE: ABNORMAL MG/DL
LEUKOCYTE ESTERASE, URINE: ABNORMAL
MICROSCOPIC EXAMINATION: YES
MUCUS: PRESENT
NITRITE, URINE: NEGATIVE
PH UR STRIP.AUTO: 6.5 [PH] (ref 5–8)
PROTEIN UA: ABNORMAL MG/DL
RBC UA: 1 /HPF (ref 0–4)
SPECIFIC GRAVITY UA: 1.01 (ref 1–1.03)
URN SPEC COLLECT METH UR: ABNORMAL
UROBILINOGEN, URINE: 1 E.U./DL
WBC UA: 6 /HPF (ref 0–5)

## 2023-01-31 PROCEDURE — 99214 OFFICE O/P EST MOD 30 MIN: CPT | Performed by: STUDENT IN AN ORGANIZED HEALTH CARE EDUCATION/TRAINING PROGRAM

## 2023-01-31 ASSESSMENT — PATIENT HEALTH QUESTIONNAIRE - PHQ9
SUM OF ALL RESPONSES TO PHQ QUESTIONS 1-9: 0
SUM OF ALL RESPONSES TO PHQ QUESTIONS 1-9: 0
SUM OF ALL RESPONSES TO PHQ9 QUESTIONS 1 & 2: 0
1. LITTLE INTEREST OR PLEASURE IN DOING THINGS: 0
2. FEELING DOWN, DEPRESSED OR HOPELESS: 0
SUM OF ALL RESPONSES TO PHQ QUESTIONS 1-9: 0
SUM OF ALL RESPONSES TO PHQ QUESTIONS 1-9: 0

## 2023-02-01 DIAGNOSIS — N30.01 ACUTE CYSTITIS WITH HEMATURIA: ICD-10-CM

## 2023-02-01 DIAGNOSIS — R30.0 DYSURIA: Primary | ICD-10-CM

## 2023-02-01 RX ORDER — FLUCONAZOLE 150 MG/1
150 TABLET ORAL ONCE
Qty: 1 TABLET | Refills: 0 | Status: SHIPPED | OUTPATIENT
Start: 2023-02-01 | End: 2023-02-01

## 2023-02-01 RX ORDER — CIPROFLOXACIN 250 MG/1
250 TABLET, FILM COATED ORAL 2 TIMES DAILY
Qty: 10 TABLET | Refills: 0 | Status: SHIPPED | OUTPATIENT
Start: 2023-02-01 | End: 2023-02-06

## 2023-02-01 NOTE — RESULT ENCOUNTER NOTE
Results reviewed. Urine analysis suggestive of UTI. Will start her on ciprofloxacin to be taken for 5 days twice daily. Along with this, I have prescribed with fluconazole to treat/prevent for fungal infection.

## 2023-02-02 LAB
C. TRACHOMATIS DNA ,URINE: NEGATIVE
N. GONORRHOEAE DNA, URINE: NEGATIVE

## 2023-02-14 DIAGNOSIS — B37.31 VULVOVAGINAL CANDIDIASIS: Primary | ICD-10-CM

## 2023-02-14 RX ORDER — FLUCONAZOLE 150 MG/1
150 TABLET ORAL EVERY OTHER DAY
Qty: 3 TABLET | Refills: 0 | Status: SHIPPED | OUTPATIENT
Start: 2023-02-14 | End: 2023-02-19

## 2023-04-27 NOTE — ED TRIAGE NOTES
Detail Level: Detailed Pt here with symptoms of UTI, yeast infection, and possibly an STD for ~ 1 week Biopsy Photograph Reviewed: Yes Number Of Curettages: 3 Size Of Lesion In Cm: 0.6 Size Of Lesion After Curettage: 1.2 Add Intralesional Injection: No Concentration (Mg/Ml Or Millions Of Plaque Forming Units/Cc): 0.01 Total Volume (Ccs): 1 Anesthesia Type: 1% lidocaine with epinephrine Cautery Type: electrodesiccation What Was Performed First?: Curettage Final Size Statement: The size of the lesion after curettage was Additional Information: (Optional): The wound was cleaned, and a pressure dressing was applied.  The patient received detailed post-op instructions. Consent was obtained from the patient. The risks, benefits and alternatives to therapy were discussed in detail. Specifically, the risks of infection, scarring, bleeding, prolonged wound healing, nerve injury, incomplete removal, allergy to anesthesia and recurrence were addressed. Alternatives to ED&C, such as: surgical removal and XRT were also discussed.  Prior to the procedure, the treatment site was clearly identified and confirmed by the patient. All components of Universal Protocol/PAUSE Rule completed. Post-Care Instructions: I reviewed with the patient in detail post-care instructions. Patient is to keep the area dry for 48 hours, and not to engage in any swimming until the area is healed. Should the patient develop any fevers, chills, bleeding, severe pain patient will contact the office immediately. Bill As A Line Item Or As Units: Line Item

## 2023-07-28 ENCOUNTER — OFFICE VISIT (OUTPATIENT)
Dept: SURGERY | Age: 28
End: 2023-07-28
Payer: COMMERCIAL

## 2023-07-28 ENCOUNTER — OFFICE VISIT (OUTPATIENT)
Dept: INTERNAL MEDICINE CLINIC | Age: 28
End: 2023-07-28

## 2023-07-28 VITALS
DIASTOLIC BLOOD PRESSURE: 70 MMHG | HEART RATE: 75 BPM | BODY MASS INDEX: 31.83 KG/M2 | WEIGHT: 174 LBS | OXYGEN SATURATION: 98 % | SYSTOLIC BLOOD PRESSURE: 104 MMHG | TEMPERATURE: 97.8 F

## 2023-07-28 VITALS
WEIGHT: 174 LBS | BODY MASS INDEX: 32.02 KG/M2 | HEIGHT: 62 IN | DIASTOLIC BLOOD PRESSURE: 62 MMHG | HEART RATE: 82 BPM | OXYGEN SATURATION: 99 % | SYSTOLIC BLOOD PRESSURE: 102 MMHG

## 2023-07-28 DIAGNOSIS — N64.89 PENDULOUS BREAST: ICD-10-CM

## 2023-07-28 DIAGNOSIS — Z11.3 SCREENING EXAMINATION FOR STD (SEXUALLY TRANSMITTED DISEASE): ICD-10-CM

## 2023-07-28 DIAGNOSIS — Z98.890 STATUS POST SURGERY: Primary | ICD-10-CM

## 2023-07-28 DIAGNOSIS — M79.3 PANNICULITIS: Primary | ICD-10-CM

## 2023-07-28 LAB
DEPRECATED RDW RBC AUTO: 24.7 % (ref 12.4–15.4)
HBV SURFACE AB SERPL IA-ACNC: 598.5 MIU/ML
HBV SURFACE AG SERPL QL IA: NORMAL
HCT VFR BLD AUTO: 38.2 % (ref 36–48)
HCV AB SERPL QL IA: NORMAL
HGB BLD-MCNC: 12.4 G/DL (ref 12–16)
MCH RBC QN AUTO: 25.7 PG (ref 26–34)
MCHC RBC AUTO-ENTMCNC: 32.4 G/DL (ref 31–36)
MCV RBC AUTO: 79.1 FL (ref 80–100)
PLATELET # BLD AUTO: 277 K/UL (ref 135–450)
PMV BLD AUTO: 7.8 FL (ref 5–10.5)
RBC # BLD AUTO: 4.83 M/UL (ref 4–5.2)
WBC # BLD AUTO: 4.8 K/UL (ref 4–11)

## 2023-07-28 PROCEDURE — G8417 CALC BMI ABV UP PARAM F/U: HCPCS

## 2023-07-28 PROCEDURE — 99214 OFFICE O/P EST MOD 30 MIN: CPT

## 2023-07-28 PROCEDURE — 1036F TOBACCO NON-USER: CPT

## 2023-07-28 PROCEDURE — G8428 CUR MEDS NOT DOCUMENT: HCPCS

## 2023-07-28 RX ORDER — FERROUS SULFATE 325(65) MG
1 TABLET ORAL
COMMUNITY
Start: 2023-06-01

## 2023-07-28 ASSESSMENT — ENCOUNTER SYMPTOMS
RHINORRHEA: 0
COUGH: 0
NAUSEA: 0
EYE DISCHARGE: 0
BACK PAIN: 0
ABDOMINAL PAIN: 0
EYE REDNESS: 0
SORE THROAT: 0
VOMITING: 0
CHEST TIGHTNESS: 0

## 2023-07-28 NOTE — PROGRESS NOTES
Breast Symptoms:     Macromastia Symptoms:     Upper back pain: Yes                                                  Bra strap grooves: Yes                                                  Wears supportive bras (>1 yr): Yes                                                  Tried conservative measures (PT, MDs,etc): Yes, MD for back pain                                                  Intertrigo: Yes                                                  Head/neck pain: Yes                                                  Headaches:  Yes                                                  Paresthesias of hands/fingers: Yes     Pregnancy / Miscarriage: 2/1     Past Medical History        Past Medical History:   Diagnosis Date    Dizziness and giddiness 4/27/2019    E coli enteritis      Headache      Premature birth           Past Surgical History         Past Surgical History:   Procedure Laterality Date    ABDOMEN SURGERY   1996     bowel resection    ADENOIDECTOMY        SMALL INTESTINE SURGERY        STOMACH SURGERY   2020     Gastric Sleeve    TONSILLECTOMY             Social History               Socioeconomic History    Marital status: Single       Spouse name: Not on file    Number of children: Not on file    Years of education: Not on file    Highest education level: Not on file   Occupational History    Not on file   Tobacco Use    Smoking status: Never    Smokeless tobacco: Never   Substance and Sexual Activity    Alcohol use: No    Drug use: No    Sexual activity: Yes       Partners: Male   Other Topics Concern    Not on file   Social History Narrative    Not on file      Social Determinants of Health          Financial Resource Strain: Low Risk     Difficulty of Paying Living Expenses: Not hard at all   Food Insecurity: No Food Insecurity    Worried About Running Out of Food in the Last Year: Never true    Ran Out of Food in the Last Year: Never true   Transportation Needs: No Transportation Needs    Lack of

## 2023-07-28 NOTE — PROGRESS NOTES
Иван Morales (:  1995) is a 33 y.o. female,Established patient, here to discuss about her breast reduction and medication management. She has a history of recurrent vulvovaginitis, bacterial vaginosis and candidiasis She has not tolerated oral metronidazole due to vomiting, tolerating pv gel. --- - gastric sleeve surgery at [facility]. Around 100 pounds 2 years ago. - On 6/3 underwent Diagnostic laparoscopy, Exploratory laparotomy, Extensive lysis of adhesions, Primary repair of enterotomy x2, Partial excision of suspected ovarian cyst, Appendectomy, Bladder closure, and Cystoscopy     For macromastia along with excess skin in the arm, seen by plastic surgeon, patient needs to lose weight prior to getting the surgery. ASSESSMENT/PLAN:    1. Dysuria-new, will send for the UA and treat as needed. -     Urinalysis with Microscopic  -     C.trachomatis N.gonorrhoeae DNA, Urine; Future  2. Foul smelling urine along with the vaginal discharge, concern for STD and UTI. Will get labs. -     Urinalysis with Microscopic  -     C.trachomatis N.gonorrhoeae DNA, Urine; Future  3. Microcytic anemia-labs reviewed from [facility], will check her levels again along with iron study. -     CBC with Auto Differential; Future  -     Ferritin; Future  -     Iron and TIBC; Future  -     Basic Metabolic Panel; Future    Patient has requested for antifungal agent if she is started on antibiotic. SUBJECTIVE/OBJECTIVE:  Other  This is a recurrent problem. Episode onset: Since. The problem occurs constantly. Pertinent negatives include no abdominal pain, chest pain, chills, congestion, coughing, fatigue, fever, headaches, nausea, sore throat or vomiting. Dysuria   This is a new problem. The current episode started in the past 7 days. The problem has been gradually worsening. The quality of the pain is described as shooting. The pain is moderate. There has been no fever. She is Sexually active. 6

## 2023-07-29 LAB
HIV 1+2 AB+HIV1 P24 AG SERPL QL IA: NORMAL
HIV 2 AB SERPL QL IA: NORMAL
HIV1 AB SERPL QL IA: NORMAL
HIV1 P24 AG SERPL QL IA: NORMAL

## 2023-07-30 DIAGNOSIS — A49.3 INFECTION, MYCOPLASMA: ICD-10-CM

## 2023-07-30 DIAGNOSIS — B96.89 BACTERIAL VAGINOSIS: Primary | ICD-10-CM

## 2023-07-30 DIAGNOSIS — N76.0 BACTERIAL VAGINOSIS: Primary | ICD-10-CM

## 2023-07-30 LAB
C TRACH DNA SPEC QL NAA+PROBE: NOT DETECTED
CANDIDA RRNA VAG QL PROBE: NOT DETECTED
CANDIDA RRNA VAG QL PROBE: NOT DETECTED
CARBAPENEM RESISTANCE OXA-48 GENE BY PCR: NOT DETECTED
CEPHALOSPORIN RESISTANCE AMPC GENE: NOT DETECTED
ESBL RESISTANCE: NOT DETECTED
G VAGINALIS RRNA GENITAL QL PROBE: ABNORMAL
HBV CORE AB SERPL QL IA: NEGATIVE
M HOMINIS DNA BLD QL NAA+PROBE: DETECTED
MACROLIDE RESISTANCE: ABNORMAL
METHICILLIN RESISTANCE: NOT DETECTED
MYCOPLASMA DNA SPEC QL NAA+PROBE: NOT DETECTED
N GONORRHOEA DNA SPEC QL NAA+PROBE: NOT DETECTED
OTHER MICROORG DNA SPEC QL NAA+PROBE: ABNORMAL
OTHER MICROORG DNA SPEC QL NAA+PROBE: DETECTED
QUINOLONE AND FLUOROQUINOLONE RESISTANCE: NOT DETECTED
T VAGINALIS RRNA SPEC QL NAA+PROBE: NOT DETECTED
TETRACYCLINE RESISTANCE: ABNORMAL
TRIMETHOPRIM/SULFONAMIDE RESISTANCE: ABNORMAL

## 2023-07-30 RX ORDER — DOXYCYCLINE HYCLATE 100 MG
100 TABLET ORAL 2 TIMES DAILY
Qty: 10 TABLET | Refills: 0 | Status: SHIPPED | OUTPATIENT
Start: 2023-07-30 | End: 2023-08-04

## 2023-07-30 RX ORDER — METRONIDAZOLE 500 MG/1
500 TABLET ORAL 2 TIMES DAILY
Qty: 14 TABLET | Refills: 0 | Status: SHIPPED | OUTPATIENT
Start: 2023-07-30 | End: 2023-08-06

## 2023-07-31 ENCOUNTER — TELEPHONE (OUTPATIENT)
Dept: INTERNAL MEDICINE CLINIC | Age: 28
End: 2023-07-31

## 2023-07-31 DIAGNOSIS — A49.3 INFECTION, MYCOPLASMA: ICD-10-CM

## 2023-07-31 DIAGNOSIS — N76.0 BACTERIAL VAGINOSIS: ICD-10-CM

## 2023-07-31 DIAGNOSIS — B96.89 BACTERIAL VAGINOSIS: ICD-10-CM

## 2023-07-31 RX ORDER — DOXYCYCLINE HYCLATE 100 MG
100 TABLET ORAL 2 TIMES DAILY
Qty: 10 TABLET | Refills: 0 | Status: SHIPPED | OUTPATIENT
Start: 2023-07-31 | End: 2023-08-05

## 2023-07-31 RX ORDER — METRONIDAZOLE 500 MG/1
500 TABLET ORAL 2 TIMES DAILY
Qty: 14 TABLET | Refills: 0 | Status: SHIPPED | OUTPATIENT
Start: 2023-07-31 | End: 2023-08-07

## 2023-08-09 ENCOUNTER — TELEPHONE (OUTPATIENT)
Dept: INTERNAL MEDICINE CLINIC | Age: 28
End: 2023-08-09

## 2023-08-09 DIAGNOSIS — B96.89 BV (BACTERIAL VAGINOSIS): ICD-10-CM

## 2023-08-09 DIAGNOSIS — N76.0 BV (BACTERIAL VAGINOSIS): ICD-10-CM

## 2023-08-09 DIAGNOSIS — B37.31 VULVOVAGINAL CANDIDIASIS: Primary | ICD-10-CM

## 2023-08-09 RX ORDER — FLUCONAZOLE 150 MG/1
TABLET ORAL
COMMUNITY
Start: 2023-06-23 | End: 2023-08-09 | Stop reason: SDUPTHER

## 2023-08-09 RX ORDER — FLUCONAZOLE 150 MG/1
150 TABLET ORAL ONCE
Qty: 1 TABLET | Refills: 0 | Status: SHIPPED | OUTPATIENT
Start: 2023-08-09 | End: 2023-08-09

## 2023-08-09 RX ORDER — METRONIDAZOLE 500 MG/1
500 TABLET ORAL 2 TIMES DAILY
Qty: 10 TABLET | Refills: 0 | Status: SHIPPED | OUTPATIENT
Start: 2023-08-09

## 2023-08-09 RX ORDER — METRONIDAZOLE 500 MG/1
TABLET ORAL
COMMUNITY
Start: 2023-08-08 | End: 2023-08-09 | Stop reason: SDUPTHER

## 2023-08-09 NOTE — TELEPHONE ENCOUNTER
Test were positive and suggestive of for Bacterial vaginosis,mycoplasma and fungal infection, prescriptions were sent- Doxycycline, flagyl and diflucan.      Patient has been informed of the above, however she states that she only got the Doxycycline and not the Flagyl or Diflucan,  Asked that you please resend to [facility] in [facility] [address] ( In Chart)

## 2023-08-09 NOTE — TELEPHONE ENCOUNTER
Patient would like to know if she has any STD's on her labs?  From 7/28/2023    States that no one called her, however it looks like Dr Paulette Fuchs sent her a message via My Chart/    Please advise

## 2023-08-09 NOTE — TELEPHONE ENCOUNTER
Please let her know that the hepatitis B surface antibody being high shows that she is immune-from the vaccination that she has received. Based on other lab results, prescriptions was already sent to the preferred pharmacy.       Patient informed of above, would like to know about her urine studies if any of those results are STD's?

## 2023-08-14 ENCOUNTER — TELEPHONE (OUTPATIENT)
Dept: SURGERY | Age: 28
End: 2023-08-14

## 2023-08-14 NOTE — TELEPHONE ENCOUNTER
The patient was in the office to see Estelita Arango 7-. PLAN: She has done well with losing additional weight. We discussed multiple options including mastopexy verses BBR. She has decided to move forward with a mastopexy and brachioplasty We will submit to insurance and schedule. I received a surgery letter. I lmom for the patient at the home number listed. I requested a call back to discuss needed medical records. I will leave this phone note open.

## 2023-09-13 NOTE — TELEPHONE ENCOUNTER
The patient returned my call mentioned below. She stated that she does not have any medical records in regards to hanging skin on bilateral arms that is effecting her daily life and activities. She mentioned that she saw Dr. Joann Mcnulty [facility]) and discussed her breasts. The patient is aware that I will view the records in Epic and submit anything that will be helpful. The patient was provided an insurance update. I faxed an [facility] [employer] Prior Authorization Request Form, clinicals and colored pictures today. I will scan the information that I faxed and the fax success into Epic under the media tab, but I am not able to scan colored pictures. I will leave this phone note open.

## 2023-09-22 NOTE — TELEPHONE ENCOUNTER
I spoke with the patient at the home number listed to discuss the [address] below. The patient stated that she didn't go to the right doctor because she knows plenty of people that have had both of the procedures listed covered and approved. I suggested that the patient call [facility] directly to see what they are looking for prior to approving her case. The patient is aware that I will close this phone note and that if she needs us in the future we are happy to be helpful. I will remove the surgery letter from the letter queue. I will close this phone note.

## 2024-08-01 ENCOUNTER — OFFICE VISIT (OUTPATIENT)
Dept: BARIATRICS/WEIGHT MGMT | Age: 29
End: 2024-08-01
Payer: COMMERCIAL

## 2024-08-01 VITALS
DIASTOLIC BLOOD PRESSURE: 72 MMHG | HEIGHT: 63 IN | BODY MASS INDEX: 37.9 KG/M2 | OXYGEN SATURATION: 96 % | SYSTOLIC BLOOD PRESSURE: 104 MMHG | WEIGHT: 213.9 LBS | HEART RATE: 74 BPM

## 2024-08-01 DIAGNOSIS — Z79.899 MEDICATION MANAGEMENT: ICD-10-CM

## 2024-08-01 DIAGNOSIS — Z90.3 HISTORY OF SLEEVE GASTRECTOMY: Primary | ICD-10-CM

## 2024-08-01 PROCEDURE — G8417 CALC BMI ABV UP PARAM F/U: HCPCS | Performed by: SURGERY

## 2024-08-01 PROCEDURE — 1036F TOBACCO NON-USER: CPT | Performed by: SURGERY

## 2024-08-01 PROCEDURE — 99204 OFFICE O/P NEW MOD 45 MIN: CPT | Performed by: SURGERY

## 2024-08-01 PROCEDURE — G8427 DOCREV CUR MEDS BY ELIG CLIN: HCPCS | Performed by: SURGERY

## 2024-08-01 RX ORDER — ESCITALOPRAM OXALATE 10 MG/1
10 TABLET ORAL DAILY
COMMUNITY
Start: 2024-05-23

## 2024-08-01 RX ORDER — NYSTATIN 100000 [USP'U]/G
POWDER TOPICAL
Qty: 60 G | Refills: 3 | Status: SHIPPED | OUTPATIENT
Start: 2024-08-01

## 2024-08-01 RX ORDER — DEXTROAMPHETAMINE SACCHARATE, AMPHETAMINE ASPARTATE, DEXTROAMPHETAMINE SULFATE AND AMPHETAMINE SULFATE 2.5; 2.5; 2.5; 2.5 MG/1; MG/1; MG/1; MG/1
10 TABLET ORAL DAILY
COMMUNITY
Start: 2024-05-23

## 2024-08-01 ASSESSMENT — ENCOUNTER SYMPTOMS: BACK PAIN: 1

## 2024-08-01 NOTE — PROGRESS NOTES
for eval of pannus. Nystantin powder, keep area clean and dry.     5. Not candidate for surgery given BMI is only 37.      Patient was seen with total face-to-face time and time spent reviewing chart, placing orders, and reviewing past/current results of labs and images of over 45 minutes. More than 50% of this visit was counseling on diet, nutrition, surgical options, and education as above documented in my note. Reviewed notes from [place] and also from [place].     Electronically signed by Justin Trinh II, MD on 8/1/2024 at 12:07 PM

## 2025-04-15 ENCOUNTER — TELEPHONE (OUTPATIENT)
Age: 30
End: 2025-04-15

## 2025-04-15 NOTE — TELEPHONE ENCOUNTER
Patient called asking if the panniculectomy tummy tuck and breast lift can be all approved through insurance. Advised she would need to discuss what she wants with Dr. Interiano first before we can run any authorization, but usually the panniculectomy code is the only one approved the rest of the requests are usually out of pocket. She will discuss with provider at her appt

## 2025-06-24 ENCOUNTER — OFFICE VISIT (OUTPATIENT)
Age: 30
End: 2025-06-24
Payer: COMMERCIAL

## 2025-06-24 VITALS — BODY MASS INDEX: 34.36 KG/M2 | WEIGHT: 193.9 LBS | HEIGHT: 63 IN

## 2025-06-24 DIAGNOSIS — N64.81 BREAST PTOSIS: Primary | ICD-10-CM

## 2025-06-24 PROCEDURE — G8417 CALC BMI ABV UP PARAM F/U: HCPCS | Performed by: PLASTIC SURGERY

## 2025-06-24 PROCEDURE — G8428 CUR MEDS NOT DOCUMENT: HCPCS | Performed by: PLASTIC SURGERY

## 2025-06-24 PROCEDURE — 99202 OFFICE O/P NEW SF 15 MIN: CPT | Performed by: PLASTIC SURGERY

## 2025-06-24 PROCEDURE — 1036F TOBACCO NON-USER: CPT | Performed by: PLASTIC SURGERY

## 2025-06-24 NOTE — PROGRESS NOTES
Plastic Surgery Consultation    Date  6/24/2025 12:29 PM      Chief Complaint  Breast ptosis    Reason for visit  Discuss bilateral breast reduction    History of present illness  Ty Yue Linares is a 29 y.o. female who presents with signs and symptoms of macromastia.  Patient with prior history of gastric bypass.  She lost significant mount of weight.  Patient recently underwent an abdominoplasty in [facility].  Patient reports due to intraoperative hypotension her bilateral mastopexy was aborted.  She is here to discuss bilateral mastopexy.  Denies any history of PE DVT or any bleeding dyscrasias, no chest pain or shortness of breath.        REVIEW OF SYSTEMS  Negative except for as mentioned in HPI.      ALLERGIES  No Known Allergies    MEDICATIONS  Current Outpatient Medications   Medication Sig Dispense Refill    amphetamine-dextroamphetamine (ADDERALL) 10 MG tablet Take 1 tablet by mouth daily.      escitalopram (LEXAPRO) 10 MG tablet Take 1 tablet by mouth daily      Semaglutide,0.25 or 0.5MG/DOS, 2 MG/1.5ML SOPN Inject 0.25 mg into the skin every 7 days      nystatin (MYCOSTATIN) 987610 UNIT/GM powder Apply 3 times daily. 60 g 3    metroNIDAZOLE (FLAGYL) 500 MG tablet Take 1 tablet by mouth 2 times daily 10 tablet 0    ferrous sulfate (IRON 325) 325 (65 Fe) MG tablet Take 1 tablet by mouth daily (with breakfast) 30 tablet 3     No current facility-administered medications for this visit.       PAST MEDICAL HISTORY  Past Medical History:   Diagnosis Date    Dizziness and giddiness 4/27/2019    E coli enteritis     Headache     Premature birth        PAST SURGICAL HISTORY  Past Surgical History:   Procedure Laterality Date    ABDOMEN SURGERY  1996    bowel resection    ADENOIDECTOMY      APPENDECTOMY      OVARIAN CYST REMOVAL  06/03/2023    SMALL INTESTINE SURGERY      STOMACH SURGERY  2020    Gastric Sleeve    TONSILLECTOMY         FAMILY HISTORY  No family history on file.    SOCIAL